# Patient Record
Sex: FEMALE | Race: BLACK OR AFRICAN AMERICAN | NOT HISPANIC OR LATINO | Employment: STUDENT | URBAN - METROPOLITAN AREA
[De-identification: names, ages, dates, MRNs, and addresses within clinical notes are randomized per-mention and may not be internally consistent; named-entity substitution may affect disease eponyms.]

---
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
## 2022-06-30 ENCOUNTER — OFFICE VISIT (OUTPATIENT)
Dept: PEDIATRICS | Facility: CLINIC | Age: 22
End: 2022-06-30
Payer: MEDICAID

## 2022-06-30 ENCOUNTER — TELEPHONE (OUTPATIENT)
Dept: PEDIATRICS | Facility: CLINIC | Age: 22
End: 2022-06-30

## 2022-06-30 VITALS
WEIGHT: 177.94 LBS | BODY MASS INDEX: 35.87 KG/M2 | TEMPERATURE: 98 F | OXYGEN SATURATION: 100 % | HEIGHT: 59 IN | HEART RATE: 83 BPM | DIASTOLIC BLOOD PRESSURE: 59 MMHG | SYSTOLIC BLOOD PRESSURE: 112 MMHG

## 2022-06-30 DIAGNOSIS — F90.0 ATTENTION DEFICIT HYPERACTIVITY DISORDER (ADHD), PREDOMINANTLY INATTENTIVE TYPE: Primary | ICD-10-CM

## 2022-06-30 DIAGNOSIS — J02.9 SORE THROAT: ICD-10-CM

## 2022-06-30 LAB
CTP QC/QA: YES
S PYO RRNA THROAT QL PROBE: NEGATIVE

## 2022-06-30 PROCEDURE — 1159F PR MEDICATION LIST DOCUMENTED IN MEDICAL RECORD: ICD-10-PCS | Mod: CPTII,S$GLB,, | Performed by: PEDIATRICS

## 2022-06-30 PROCEDURE — 3008F BODY MASS INDEX DOCD: CPT | Mod: CPTII,S$GLB,, | Performed by: PEDIATRICS

## 2022-06-30 PROCEDURE — 1160F RVW MEDS BY RX/DR IN RCRD: CPT | Mod: CPTII,S$GLB,, | Performed by: PEDIATRICS

## 2022-06-30 PROCEDURE — 3078F DIAST BP <80 MM HG: CPT | Mod: CPTII,S$GLB,, | Performed by: PEDIATRICS

## 2022-06-30 PROCEDURE — 3074F PR MOST RECENT SYSTOLIC BLOOD PRESSURE < 130 MM HG: ICD-10-PCS | Mod: CPTII,S$GLB,, | Performed by: PEDIATRICS

## 2022-06-30 PROCEDURE — 87880 STREP A ASSAY W/OPTIC: CPT | Mod: QW,,, | Performed by: PEDIATRICS

## 2022-06-30 PROCEDURE — 1160F PR REVIEW ALL MEDS BY PRESCRIBER/CLIN PHARMACIST DOCUMENTED: ICD-10-PCS | Mod: CPTII,S$GLB,, | Performed by: PEDIATRICS

## 2022-06-30 PROCEDURE — 3074F SYST BP LT 130 MM HG: CPT | Mod: CPTII,S$GLB,, | Performed by: PEDIATRICS

## 2022-06-30 PROCEDURE — 99214 OFFICE O/P EST MOD 30 MIN: CPT | Mod: S$GLB,,, | Performed by: PEDIATRICS

## 2022-06-30 PROCEDURE — 99214 PR OFFICE/OUTPT VISIT, EST, LEVL IV, 30-39 MIN: ICD-10-PCS | Mod: S$GLB,,, | Performed by: PEDIATRICS

## 2022-06-30 PROCEDURE — 87880 POCT RAPID STREP A: ICD-10-PCS | Mod: QW,,, | Performed by: PEDIATRICS

## 2022-06-30 PROCEDURE — 3078F PR MOST RECENT DIASTOLIC BLOOD PRESSURE < 80 MM HG: ICD-10-PCS | Mod: CPTII,S$GLB,, | Performed by: PEDIATRICS

## 2022-06-30 PROCEDURE — 1159F MED LIST DOCD IN RCRD: CPT | Mod: CPTII,S$GLB,, | Performed by: PEDIATRICS

## 2022-06-30 PROCEDURE — 3008F PR BODY MASS INDEX (BMI) DOCUMENTED: ICD-10-PCS | Mod: CPTII,S$GLB,, | Performed by: PEDIATRICS

## 2022-06-30 RX ORDER — LISDEXAMFETAMINE DIMESYLATE 50 MG/1
50 CAPSULE ORAL EVERY MORNING
Qty: 30 CAPSULE | Refills: 0 | Status: SHIPPED | OUTPATIENT
Start: 2022-06-30 | End: 2022-11-02

## 2022-06-30 RX ORDER — DEXTROAMPHETAMINE SACCHARATE, AMPHETAMINE ASPARTATE, DEXTROAMPHETAMINE SULFATE AND AMPHETAMINE SULFATE 2.5; 2.5; 2.5; 2.5 MG/1; MG/1; MG/1; MG/1
TABLET ORAL
Qty: 30 TABLET | Refills: 0 | Status: SHIPPED | OUTPATIENT
Start: 2022-06-30

## 2022-06-30 NOTE — PROGRESS NOTES
Subjective:      Mak [place] is a 21 y.o. female here with patient. Patient brought in for Headache (Bought in by:Self only), med check (Came by herself for med check.), and Sore Throat (Complaints of itchy  throat today./)      History of Present Illness:  HPI  Pt here for adhd med check  Takes vy 50 am, add reg 10 1/2 noon, 1/2 4 pm when at college  Worked well for patient  Wanted to get meds filled before school starts  Also with scratchy throat. Concerns about strep throat  No fever  Some headache  No meds  No known covid exposure    Review of Systems   Constitutional: Negative.  Negative for fever.   HENT: Positive for sore throat.    Eyes: Negative.    Respiratory: Negative.    Cardiovascular: Negative.    Gastrointestinal: Negative.    Endocrine: Negative.    Genitourinary: Negative.    Musculoskeletal: Negative.    Skin: Negative.    Allergic/Immunologic: Negative.    Neurological: Negative.    Hematological: Negative.    Psychiatric/Behavioral: Positive for decreased concentration.   All other systems reviewed and are negative.      Objective:     Physical Exam  nad  Tm's clear bilaterally  Post pharynx sl erythematous  heart rrr,   No murmur heard  No gallop heard  No rub noted  Lungs cta bilaterally   no increased work of breathing noted  No wheezes heard  No rales heard  No ronchi heard    Abdomen soft,   Bowel sounds present  Non tender  No masses palpated  No enlargement of liver or spleen palpated  No rashes noted  Mmm, cap refill brisk, less than 2 seconds  No obvious global/focal motor/sensory deficits  Cranial nerves 2-12 grossly intact  rom of all extremities normal for age      Assessment:        1. Attention deficit hyperactivity disorder (ADHD), predominantly inattentive type    2. Sore throat         Plan:       Mak was seen today for headache, med check and sore throat.    Diagnoses and all orders for this visit:    Attention deficit hyperactivity disorder (ADHD), predominantly  inattentive type  -     lisdexamfetamine (VYVANSE) 50 MG capsule; Take 1 capsule (50 mg total) by mouth every morning.  -     dextroamphetamine-amphetamine (ADDERALL) 10 mg Tab; 1/2 tab daily at noon and 1/2 tablet at 4pm    Sore throat  -     POCT rapid strep A      rf adhd meds    Temperature and pulse ox good in office today    Discussed covid testing.  Will hold for now    Discussed worrisome signs to seek urgent attention for  A total of 35 minutes was spent on patient record review, face to face time with patient including history and physical exam, medical decision making and patient/parent counseling  rtc 24-72 prn no  Improvement 24-72 hours or sooner prn problems.  Parent/guardian voiced understanding.

## 2022-10-24 ENCOUNTER — TELEPHONE (OUTPATIENT)
Dept: PEDIATRICS | Facility: CLINIC | Age: 22
End: 2022-10-24
Payer: MEDICAID

## 2022-10-24 NOTE — TELEPHONE ENCOUNTER
----- Message from Kari Wesley MA sent at 10/24/2022  2:05 PM CDT -----  Contact: Pt @ [phone]    ----- Message -----  From: Carol Mcnulty  Sent: 10/24/2022   1:58 PM CDT  To: [facility] Clinical Support    Pt retuning call about prescription. Pt doesn't want to give any other info about missed call, just that she needs to speak with someone from the clinic regarding meds.      Please call to advise.    Spoke with patient to schedule med check appointment. Patient needed a virtual appointment currently away at [facility].

## 2022-10-24 NOTE — TELEPHONE ENCOUNTER
----- Message from Yesika Jameson sent at 10/24/2022 12:49 PM CDT -----  Contact: Wmarh-[phone]    Requesting an RX refill or new RX.- Jose Corado-    Is this a refill or new RX: - Refill-    RX name and strength (dextroamphetamine-amphetamine (ADDERALL) 10 mg Tab     Is this a 30 day or 90 day RX: -30 tablet-    Pharmacy name and phone # (    [id number] - [place], [place] - [address] [place] [address] & [address] [address]  Phone: [phone] Fax: [phone]    The doctors have asked that we provide their patients with the following 2 reminders -- prescription refills can take up to 72 hours, and a friendly reminder that in the future you can use your [facility] account to request refills:- Call back-    Comments: Please call the patient back to advise.    Attempted to notify patient that a med check appointment is needed. Last med check 06/30/22 with provider that no longer practicing. Needs to see another doctor for medication management. Left message to rtc.

## 2022-11-02 ENCOUNTER — OFFICE VISIT (OUTPATIENT)
Dept: PEDIATRICS | Facility: CLINIC | Age: 22
End: 2022-11-02
Payer: MEDICAID

## 2022-11-02 DIAGNOSIS — F90.0 ATTENTION DEFICIT HYPERACTIVITY DISORDER (ADHD), PREDOMINANTLY INATTENTIVE TYPE: Primary | ICD-10-CM

## 2022-11-02 PROCEDURE — 99214 OFFICE O/P EST MOD 30 MIN: CPT | Mod: 95,,, | Performed by: PEDIATRICS

## 2022-11-02 PROCEDURE — 99214 PR OFFICE/OUTPT VISIT, EST, LEVL IV, 30-39 MIN: ICD-10-PCS | Mod: 95,,, | Performed by: PEDIATRICS

## 2022-11-02 RX ORDER — LISDEXAMFETAMINE DIMESYLATE 30 MG/1
30 CAPSULE ORAL EVERY MORNING
Qty: 30 CAPSULE | Refills: 0 | Status: SHIPPED | OUTPATIENT
Start: 2022-11-02

## 2022-11-02 NOTE — PROGRESS NOTES
The patient location is: [place], [place]   The chief complaint leading to consultation is: med check     Visit type: audiovisual    Face to Face time with patient: 15 min  20 minutes of total time spent on the encounter, which includes face to face time and non-face to face time preparing to see the patient (eg, review of tests), Obtaining and/or reviewing separately obtained history, Documenting clinical information in the electronic or other health record, Independently interpreting results (not separately reported) and communicating results to the patient/family/caregiver, or Care coordination (not separately reported).         Each patient to whom he or she provides medical services by telemedicine is:  (1) informed of the relationship between the physician and patient and the respective role of any other health care provider with respect to management of the patient; and (2) notified that he or she may decline to receive medical services by telemedicine and may withdraw from such care at any time.    Notes:     History was provided by the patient.  Mak [place] is a 21 y.o. female here for ADHD follow up and medication management.      Current medication(s): Vyvanase 50mg (were also previously prescribed adderall short acting as well)  Takes Medication: school days only  Currently in: university,    Attends: in person classes  School performance/Behavior: no concerns; age appropriate  Reports that they are doing well but would like to try to decrease dosage to 30mg.   Appetite: somewhat decreased while on medications but overall ok  Sleep:difficulty with going to sleep  Side effects: abdominal pain from hunger   Patient has had medication holidays.    Past Medical History:   Diagnosis Date    ADHD (attention deficit hyperactivity disorder) 9/11/2012    Med check due 10/30/16 Vyvanse 40 add       Review of Systems  Review of Systems    A comprehensive review of symptoms was completed and negative  except as noted above.    Objective:   There were no vitals filed for this visit.   Physical Exam  Constitutional:       Appearance: Normal appearance. She is not ill-appearing.   HENT:      Right Ear: External ear normal.      Left Ear: External ear normal.      Nose: Nose normal.      Mouth/Throat:      Mouth: Mucous membranes are moist.   Eyes:      Conjunctiva/sclera: Conjunctivae normal.   Pulmonary:      Effort: Pulmonary effort is normal.   Neurological:      Mental Status: She is alert.     Assessment:   Attention deficit hyperactivity disorder (ADHD), predominantly inattentive type  -     lisdexamfetamine (VYVANSE) 30 MG capsule; Take 1 capsule (30 mg total) by mouth every morning.  Dispense: 30 capsule; Refill: 0     Decreased to vyvanse 30mg from 50mg. pt to send message in 1 month, if still doing well, will refill for additional 2 months. Recommended against short acting stimulant medication and will not fill at this time. Family expressed agreement and understanding of plan and all questions were answered.       Plan:      1. Parent is aware of palpitations as a possible side effect and denies family history of heart disease. The side effects of abdominal pain and headaches  an be treated with tylenol. Insomnia and irritability can be treated with certain adjuvant therapies like clonidine and guanfacine. Lastly, transient anorexia can be treated wtih periactin as needed. Parent was counseled on medication holidays and that it can also help improve the efficacy of the medication.

## 2022-11-06 ENCOUNTER — NURSE TRIAGE (OUTPATIENT)
Dept: ADMINISTRATIVE | Facility: CLINIC | Age: 22
End: 2022-11-06
Payer: MEDICAID

## 2022-11-07 NOTE — TELEPHONE ENCOUNTER
"Patient states she recently went to [facility] and [place] diagnosed with an upper respiratory infection. She was prescribed azithromycin and phenergan. About 20 minutes ago she took the medications together and shortly after began experiencing abdominal cramping below her belly button; rates at a 5/10 on numerical pain scale. She states it feels like a burning sensation. Care advice is home care.     Reason for Disposition   [1] MILD-MODERATE pain AND [2] comes and goes (cramps)    Additional Information   Negative: Shock suspected (e.g., cold/pale/clammy skin, too weak to stand, low BP, rapid pulse)   Negative: Difficult to awaken or acting confused (e.g., disoriented, slurred speech)   Negative: Passed out (i.e., lost consciousness, collapsed and was not responding)   Negative: Sounds like a life-threatening emergency to the triager   Negative: [1] SEVERE pain (e.g., excruciating) AND [2] present > 1 hour   Negative: [1] SEVERE pain AND [2] age > 60 years   Negative: [1] Vomiting AND [2] contains red blood or black ("coffee ground") material  (Exception: few red streaks in vomit that only happened once)   Negative: Blood in bowel movements (Exception: blood on surface of BM with constipation)   Negative: Black or tarry bowel movements (Exception: chronic-unchanged black-grey bowel movements AND is taking iron pills or Pepto-bismol)   Negative: Patient sounds very sick or weak to the triager   Negative: [1] MILD-MODERATE pain AND [2] constant AND [3] present > 2 hours   Negative: [1] Vomiting AND [2] abdomen looks much more swollen than usual   Negative: [1] Vomiting AND [2] contains bile (green color)   Negative: White of the eyes have turned yellow (i.e., jaundice)   Negative: Fever > 103 F (39.4 C)   Negative: [1] Fever > 101 F (38.3 C) AND [2] age > 60 years   Negative: [1] Fever > 100.0 F (37.8 C) AND [2] bedridden (e.g., nursing home patient, CVA, chronic illness, recovering from surgery)   Negative: [1] Fever > 100.0 " F (37.8 C) AND [2] diabetes mellitus or weak immune system (e.g., HIV positive, cancer chemo, splenectomy, organ transplant, chronic steroids)   Negative: [1] SEVERE pain AND [2] present < 1 hour   Negative: [1] MODERATE pain (e.g., interferes with normal activities) AND [2] pain comes and goes (cramps) AND [3] present > 24 hours  (Exception: pain with Vomiting or Diarrhea - see that Guideline)   Negative: [1] MILD pain (e.g., does not interfere with normal activities) AND [2] pain comes and goes (cramps) AND [3] present > 48 hours  (Exception: this same abdominal pain is a chronic symptom recurrent or ongoing AND present > 4 weeks)   Negative: Age > 60 years   Negative: Pregnancy suspected (e.g., missed last menstrual period)   Negative: Unusual vaginal discharge (e.g., bad smelling, yellow, green, or foamy-white)   Negative: Blood in urine (red, pink, or tea-colored)   Negative: Abdominal pain is a chronic symptom (recurrent or ongoing AND present > 4 weeks)   Negative: Pain with sexual intercourse (dyspareunia)   Negative: [1] MILD-MODERATE pain AND [2] constant and [3] present < 2 hours    Protocols used: Abdominal Pain - Female-A-AH

## 2023-02-07 ENCOUNTER — PATIENT MESSAGE (OUTPATIENT)
Dept: PEDIATRICS | Facility: CLINIC | Age: 23
End: 2023-02-07
Payer: MEDICAID

## 2023-07-06 ENCOUNTER — PATIENT MESSAGE (OUTPATIENT)
Dept: PEDIATRICS | Facility: CLINIC | Age: 23
End: 2023-07-06
Payer: MEDICAID

## 2023-09-06 ENCOUNTER — HOSPITAL ENCOUNTER (EMERGENCY)
Facility: HOSPITAL | Age: 23
Discharge: HOME | End: 2023-09-07
Attending: EMERGENCY MEDICINE
Payer: MEDICAID

## 2023-09-06 DIAGNOSIS — J06.9 VIRAL UPPER RESPIRATORY INFECTION: Primary | ICD-10-CM

## 2023-09-06 DIAGNOSIS — L73.2 HIDRADENITIS SUPPURATIVA OF LEFT AXILLA: ICD-10-CM

## 2023-09-06 LAB
B-HCG UR QL: NEGATIVE
BACTERIA #/AREA URNS HPF: ABNORMAL /HPF
BILIRUB UR QL STRIP: NEGATIVE
CLARITY UR: CLEAR
COLOR UR: YELLOW
GLUCOSE UR QL STRIP: NEGATIVE
HGB UR QL STRIP: NEGATIVE
KETONES UR QL STRIP: NEGATIVE
LEUKOCYTE ESTERASE UR QL STRIP: ABNORMAL
MICROSCOPIC COMMENT: ABNORMAL
NITRITE UR QL STRIP: NEGATIVE
PH UR STRIP: 7 [PH] (ref 5–8)
PROT UR QL STRIP: ABNORMAL
RBC #/AREA URNS HPF: 5 /HPF (ref 0–4)
SARS-COV-2 RDRP RESP QL NAA+PROBE: NEGATIVE
SP GR UR STRIP: 1.02 (ref 1–1.03)
SQUAMOUS #/AREA URNS HPF: 4 /HPF
URN SPEC COLLECT METH UR: ABNORMAL
UROBILINOGEN UR STRIP-ACNC: NEGATIVE EU/DL
WBC #/AREA URNS HPF: 3 /HPF (ref 0–5)

## 2023-09-06 PROCEDURE — 81000 URINALYSIS NONAUTO W/SCOPE: CPT | Performed by: PHYSICIAN ASSISTANT

## 2023-09-06 PROCEDURE — U0002 COVID-19 LAB TEST NON-CDC: HCPCS | Performed by: PHYSICIAN ASSISTANT

## 2023-09-06 PROCEDURE — 99283 EMERGENCY DEPT VISIT LOW MDM: CPT

## 2023-09-06 PROCEDURE — 25000003 PHARM REV CODE 250: Performed by: PHYSICIAN ASSISTANT

## 2023-09-06 PROCEDURE — 81025 URINE PREGNANCY TEST: CPT | Performed by: PHYSICIAN ASSISTANT

## 2023-09-06 RX ORDER — IBUPROFEN 400 MG/1
800 TABLET ORAL
Status: COMPLETED | OUTPATIENT
Start: 2023-09-06 | End: 2023-09-06

## 2023-09-06 RX ORDER — ACETAMINOPHEN 500 MG
1000 TABLET ORAL
Status: COMPLETED | OUTPATIENT
Start: 2023-09-06 | End: 2023-09-06

## 2023-09-06 RX ADMIN — IBUPROFEN 800 MG: 400 TABLET ORAL at 11:09

## 2023-09-06 RX ADMIN — ACETAMINOPHEN 1000 MG: 500 TABLET ORAL at 11:09

## 2023-09-07 VITALS
WEIGHT: 183 LBS | DIASTOLIC BLOOD PRESSURE: 69 MMHG | BODY MASS INDEX: 36.89 KG/M2 | SYSTOLIC BLOOD PRESSURE: 106 MMHG | HEIGHT: 59 IN | TEMPERATURE: 98 F | RESPIRATION RATE: 16 BRPM | HEART RATE: 92 BPM | OXYGEN SATURATION: 99 %

## 2023-09-07 RX ORDER — CLINDAMYCIN HYDROCHLORIDE 150 MG/1
300 CAPSULE ORAL 4 TIMES DAILY
Qty: 40 CAPSULE | Refills: 0 | Status: SHIPPED | OUTPATIENT
Start: 2023-09-07 | End: 2023-09-12

## 2023-09-07 NOTE — ED NOTES
Pt awake, alert and oriented x 4. Resp even and unlabored on RA, lung fields clear throughout all lobes. Pt reports she began having a headache and chills this morning. Pt reports that her coworker has covid. Pt denies sore throat, n/v/d, cough or body aches. Skin wdi.

## 2023-09-07 NOTE — ED PROVIDER NOTES
Encounter Date: 9/6/2023       History     Chief Complaint   Patient presents with    Fever     Patient c/o chills and general body ache that began today.  Reported fever of 102.1 thirty minutes prior to arrival.       Patient is a 22-year-old female who presents emergency room for evaluation of a runny nose body aches chills.  She has a mild headache.  She had fever of 102.1 prior to coming to the emergency room.  She is no chest pain cough abdominal pain nausea vomiting diarrhea dysuria.  She does have some bumps in her left axilla.  She denies any bumps on her breast.  She has no history of hidradenitis.  She denies any IV drug use.      Review of patient's allergies indicates:  No Known Allergies  Past Medical History:   Diagnosis Date    ADHD (attention deficit hyperactivity disorder) 9/11/2012    Med check due 10/30/16 Vyvanse 40 add     Past Surgical History:   Procedure Laterality Date    NO PAST SURGERIES       Family History   Problem Relation Age of Onset    No Known Problems Mother     No Known Problems Father      Social History     Tobacco Use    Smoking status: Never   Substance Use Topics    Alcohol use: No    Drug use: No     Review of Systems   Constitutional:  Positive for fever. Negative for appetite change and diaphoresis.   HENT:  Positive for congestion and rhinorrhea. Negative for dental problem, ear pain, postnasal drip and sore throat.    Eyes:  Negative for pain and visual disturbance.   Respiratory:  Negative for cough and shortness of breath.    Cardiovascular:  Negative for chest pain.   Gastrointestinal:  Negative for abdominal pain, diarrhea, nausea and vomiting.   Genitourinary:  Negative for difficulty urinating.   Musculoskeletal:  Negative for arthralgias.   Skin:  Negative for rash.        Bumps in her left axilla   Neurological:  Negative for weakness, numbness and headaches.   All other systems reviewed and are negative.      Physical Exam     Initial Vitals [09/06/23 2113]    BP Pulse Resp Temp SpO2   (!) 128/59 (!) 112 18 99.9 °F (37.7 °C) 98 %      MAP       --         Physical Exam    Nursing note and vitals reviewed.  Constitutional: She appears well-developed and well-nourished.  Non-toxic appearance. No distress.   HENT:   Head: Normocephalic and atraumatic.   Mouth/Throat: Oropharynx is clear and moist.   Eyes: EOM are normal. Pupils are equal, round, and reactive to light.   Neck: Neck supple.   Cardiovascular:  Normal rate, regular rhythm, normal heart sounds and intact distal pulses.     Exam reveals no gallop and no friction rub.       No murmur heard.  Pulmonary/Chest: Breath sounds normal. No respiratory distress. She has no decreased breath sounds. She has no wheezes. She has no rhonchi. She has no rales.   Abdominal: Abdomen is soft. Bowel sounds are normal. She exhibits no distension. There is no abdominal tenderness.   Musculoskeletal:         General: Normal range of motion.      Cervical back: Neck supple.     Neurological: She is alert and oriented to person, place, and time. She has normal strength. No sensory deficit.   Skin: Skin is warm and dry.   Patient has palpable nodules in the left axilla.  There are no pinpoint pustules.  There is no drainage.  They are tender.  Does not necessarily warm.  There is no significant erythema.   Psychiatric: She has a normal mood and affect.         ED Course   Procedures  Labs Reviewed   URINALYSIS, REFLEX TO URINE CULTURE - Abnormal; Notable for the following components:       Result Value    Protein, UA Trace (*)     Leukocytes, UA Trace (*)     All other components within normal limits    Narrative:     Specimen Source->Urine   URINALYSIS MICROSCOPIC - Abnormal; Notable for the following components:    RBC, UA 5 (*)     Bacteria Few (*)     All other components within normal limits    Narrative:     Specimen Source->Urine   SARS-COV-2 RNA AMPLIFICATION, QUAL   PREGNANCY TEST, URINE RAPID    Narrative:     Specimen  Source->Urine          Imaging Results    None          Medications   ibuprofen tablet 800 mg (800 mg Oral Given 9/6/23 2330)   acetaminophen tablet 1,000 mg (1,000 mg Oral Given 9/6/23 2329)     Medical Decision Making  I suspect the patient has hidradenitis suppurativa in the left axilla.  I do not think there is a drainable abscess at this time.  She denies any breast masses.  She states she does self-breast exams.  I will start her on clindamycin.      In regards to her runny nose suspect she has early viral upper respiratory infection.  There is no signs of pneumonia.  I do not believe she is septic or toxic.  I do not think she needs labs.  Her abdomen is soft nontender nondistended.  Lungs are clear.  I do not think she needs an x-ray at this time.  Return precautions have been given and stable for discharge.  I do not believe her urinalysis is indicative of a urinary tract infection.  She has no urinary complaints.                               Clinical Impression:   Final diagnoses:  [J06.9] Viral upper respiratory infection (Primary)  [L73.2] Hidradenitis suppurativa of left axilla        ED Disposition Condition    Discharge Stable          ED Prescriptions       Medication Sig Dispense Start Date End Date Auth. Provider    clindamycin (CLEOCIN) 150 MG capsule Take 2 capsules (300 mg total) by mouth 4 (four) times daily. for 5 days 40 capsule 9/7/2023 9/12/2023 Diego Smith MD          Follow-up Information       Follow up With Specialties Details Why Contact Info    Pool Myers MD Pediatrics Schedule an appointment as soon as possible for a visit   [address] [place] [address]  [place] [address]  [phone]               Diego Smith MD  09/07/23 0036

## 2023-09-07 NOTE — FIRST PROVIDER EVALUATION
Emergency Department TeleTriage Encounter Note      CHIEF COMPLAINT    Chief Complaint   Patient presents with    Fever     Patient c/o chills and general body ache that began today.  Reported fever of 102.1 thirty minutes prior to arrival.         VITAL SIGNS   Initial Vitals [09/06/23 2113]   BP Pulse Resp Temp SpO2   (!) 128/59 (!) 112 18 99.9 °F (37.7 °C) 98 %      MAP       --            ALLERGIES    Review of patient's allergies indicates:  No Known Allergies    PROVIDER TRIAGE NOTE    22-year-old female presents with 1 day symptoms of generalized malaise, fatigue and fever.  No medication taken prior to arrival.  Appears nondistressed on exam.    ORDERS  Labs Reviewed - No data to display    ED Orders (720h ago, onward)      None              Virtual Visit Note: The provider triage portion of this emergency department evaluation and documentation was performed via [employer], a HIPAA-compliant telemedicine application, in concert with a tele-presenter in the room. A face to face patient evaluation with one of my colleagues will occur once the patient is placed in an emergency department room.      DISCLAIMER: This note was prepared with [employer] voice recognition transcription software. Garbled syntax, mangled pronouns, and other bizarre constructions may be attributed to that software system.

## 2024-07-11 ENCOUNTER — OFFICE VISIT (OUTPATIENT)
Dept: URGENT CARE | Facility: CLINIC | Age: 24
End: 2024-07-11

## 2024-07-11 VITALS
RESPIRATION RATE: 16 BRPM | TEMPERATURE: 98 F | HEART RATE: 80 BPM | OXYGEN SATURATION: 99 % | SYSTOLIC BLOOD PRESSURE: 104 MMHG | WEIGHT: 179 LBS | HEIGHT: 59 IN | BODY MASS INDEX: 36.08 KG/M2 | DIASTOLIC BLOOD PRESSURE: 71 MMHG

## 2024-07-11 DIAGNOSIS — J02.9 SORE THROAT: Primary | ICD-10-CM

## 2024-07-11 DIAGNOSIS — R53.83 FATIGUE, UNSPECIFIED TYPE: ICD-10-CM

## 2024-07-11 DIAGNOSIS — M54.2 NECK PAIN: ICD-10-CM

## 2024-07-11 LAB
CTP QC/QA: YES
S PYO RRNA THROAT QL PROBE: NEGATIVE

## 2024-07-11 RX ORDER — DEXAMETHASONE SODIUM PHOSPHATE 4 MG/ML
8 INJECTION, SOLUTION INTRA-ARTICULAR; INTRALESIONAL; INTRAMUSCULAR; INTRAVENOUS; SOFT TISSUE
Status: COMPLETED | OUTPATIENT
Start: 2024-07-11 | End: 2024-07-11

## 2024-07-11 RX ADMIN — DEXAMETHASONE SODIUM PHOSPHATE 8 MG: 4 INJECTION, SOLUTION INTRA-ARTICULAR; INTRALESIONAL; INTRAMUSCULAR; INTRAVENOUS; SOFT TISSUE at 01:07

## 2024-07-11 NOTE — PROGRESS NOTES
"Subjective:      Patient ID: Mak aGrcia is a 23 y.o. female.    Vitals:  height is 4' 11" (1.499 m) and weight is 81.2 kg (179 lb). Her temperature is 98.4 °F (36.9 °C). Her blood pressure is 104/71 and her pulse is 80. Her respiration is 16 and oxygen saturation is 99%.     Chief Complaint: Neck Pain    Pt c/o neck pain x2 days. Pt states her whole neck is hurting and pain is worsening.     Neck Pain   Pertinent negatives include no fever, headaches, numbness or trouble swallowing.       Constitution: Positive for appetite change, chills and fatigue. Negative for fever.   HENT:  Positive for sore throat. Negative for ear pain, ear discharge, drooling, congestion, trouble swallowing and voice change.    Neck: Positive for neck pain and neck stiffness. Negative for neck swelling.   Eyes:  Negative for vision loss, double vision and blurred vision.   Respiratory:  Negative for chest tightness, cough (cough last week, resolved) and shortness of breath.    Gastrointestinal:  Negative for abdominal pain, nausea, vomiting and diarrhea.   Musculoskeletal:  Negative for trauma.   Skin:  Negative for rash and erythema.   Neurological:  Negative for headaches, disorientation, altered mental status, numbness and tingling.   Psychiatric/Behavioral:  Negative for altered mental status and disorientation.       Objective:     Physical Exam   Constitutional: She is oriented to person, place, and time.  Non-toxic appearance. She does not appear ill. No distress. obesity  HENT:   Head: Normocephalic and atraumatic.   Ears:   Right Ear: Tympanic membrane, external ear and ear canal normal.   Left Ear: Tympanic membrane, external ear and ear canal normal.   Nose: Nose normal.   Mouth/Throat: Mucous membranes are moist. No oropharyngeal exudate or posterior oropharyngeal erythema.   Eyes: Conjunctivae are normal. Pupils are equal, round, and reactive to light. Right eye exhibits no discharge. Left eye exhibits no discharge. " Extraocular movement intact   Neck: Neck supple. There are no signs of injury. No torticollis present. No neck rigidity present. No decreased range of motion present. pain with movement present. No spinous process tenderness present. muscular tenderness present.   Cardiovascular: Normal rate and regular rhythm.   Pulmonary/Chest: Effort normal. No respiratory distress.   Abdominal: Normal appearance.   Musculoskeletal:      Cervical back: She exhibits tenderness.   Lymphadenopathy:     She has no cervical adenopathy.   Neurological: no focal deficit. She is alert and oriented to person, place, and time.   Skin: Skin is warm, dry, not diaphoretic and no rash. Capillary refill takes less than 2 seconds. No bruising, No erythema and No lesion   Psychiatric: Her behavior is normal. Mood normal.   Nursing note and vitals reviewed.      Assessment:     1. Sore throat    2. Neck pain    3. Fatigue, unspecified type          Strep A negative      Plan:       Sore throat  -     POCT rapid strep A  -     Cancel: SARS Coronavirus 2 Antigen, POCT Manual Read  -     Cancel: POCT Influenza A/B  -     Cancel: POCT Infectious mononucleosis antibody    Neck pain  -     dexAMETHasone injection 8 mg    Fatigue, unspecified type  -     POCT rapid strep A  -     Cancel: SARS Coronavirus 2 Antigen, POCT Manual Read  -     Cancel: POCT Influenza A/B  -     Cancel: POCT Infectious mononucleosis antibody

## 2024-07-11 NOTE — PATIENT INSTRUCTIONS
Rest, hydrate  Ibuprofen 600-800 mg every 6 hrs as needed for pain.  Always take with food.    ER for worsening of symptoms.

## 2025-03-18 ENCOUNTER — OFFICE VISIT (OUTPATIENT)
Dept: URGENT CARE | Facility: CLINIC | Age: 25
End: 2025-03-18

## 2025-03-18 ENCOUNTER — HOSPITAL ENCOUNTER (OUTPATIENT)
Facility: HOSPITAL | Age: 25
LOS: 1 days | Discharge: HOME | End: 2025-03-19
Attending: EMERGENCY MEDICINE

## 2025-03-18 VITALS
HEIGHT: 59 IN | RESPIRATION RATE: 18 BRPM | WEIGHT: 179 LBS | HEART RATE: 121 BPM | TEMPERATURE: 98 F | DIASTOLIC BLOOD PRESSURE: 83 MMHG | OXYGEN SATURATION: 99 % | BODY MASS INDEX: 36.08 KG/M2 | SYSTOLIC BLOOD PRESSURE: 121 MMHG

## 2025-03-18 DIAGNOSIS — R10.13 EPIGASTRIC PAIN: ICD-10-CM

## 2025-03-18 DIAGNOSIS — R11.2 NAUSEA AND VOMITING, UNSPECIFIED VOMITING TYPE: Primary | ICD-10-CM

## 2025-03-18 DIAGNOSIS — R00.0 TACHYCARDIA: ICD-10-CM

## 2025-03-18 DIAGNOSIS — R07.9 CHEST PAIN: ICD-10-CM

## 2025-03-18 PROBLEM — E86.0 DEHYDRATION: Status: ACTIVE | Noted: 2025-03-18

## 2025-03-18 PROBLEM — E66.9 OBESITY (BMI 30-39.9): Status: ACTIVE | Noted: 2025-03-18

## 2025-03-18 PROBLEM — E87.1 HYPONATREMIA: Status: ACTIVE | Noted: 2025-03-18

## 2025-03-18 PROBLEM — R74.8 ELEVATED LIPASE: Status: ACTIVE | Noted: 2025-03-18

## 2025-03-18 LAB
ALBUMIN SERPL BCP-MCNC: 4.3 G/DL (ref 3.5–5.2)
ALP SERPL-CCNC: 82 U/L (ref 40–150)
ALT SERPL W/O P-5'-P-CCNC: 10 U/L (ref 10–44)
ANION GAP SERPL CALC-SCNC: 11 MMOL/L (ref 8–16)
AST SERPL-CCNC: 20 U/L (ref 10–40)
B-HCG UR QL: NEGATIVE
BACTERIA #/AREA URNS HPF: ABNORMAL /HPF
BASOPHILS # BLD AUTO: 0.01 K/UL (ref 0–0.2)
BASOPHILS NFR BLD: 0.1 % (ref 0–1.9)
BILIRUB SERPL-MCNC: 0.6 MG/DL (ref 0.1–1)
BILIRUB UR QL STRIP: NEGATIVE
BUN SERPL-MCNC: 17 MG/DL (ref 6–20)
CALCIUM SERPL-MCNC: 9.6 MG/DL (ref 8.7–10.5)
CHLORIDE SERPL-SCNC: 102 MMOL/L (ref 95–110)
CLARITY UR: ABNORMAL
CO2 SERPL-SCNC: 22 MMOL/L (ref 23–29)
COLOR UR: YELLOW
CREAT SERPL-MCNC: 0.8 MG/DL (ref 0.5–1.4)
CTP QC/QA: YES
DIFFERENTIAL METHOD BLD: ABNORMAL
EOSINOPHIL # BLD AUTO: 0 K/UL (ref 0–0.5)
EOSINOPHIL NFR BLD: 0 % (ref 0–8)
ERYTHROCYTE [DISTWIDTH] IN BLOOD BY AUTOMATED COUNT: 12.2 % (ref 11.5–14.5)
EST. GFR  (NO RACE VARIABLE): >60 ML/MIN/1.73 M^2
GLUCOSE SERPL-MCNC: 74 MG/DL (ref 70–110)
GLUCOSE UR QL STRIP: NEGATIVE
HCT VFR BLD AUTO: 42.3 % (ref 37–48.5)
HCV AB SERPL QL IA: NEGATIVE
HGB BLD-MCNC: 13.4 G/DL (ref 12–16)
HGB UR QL STRIP: ABNORMAL
HIV 1+2 AB+HIV1 P24 AG SERPL QL IA: NEGATIVE
HYALINE CASTS #/AREA URNS LPF: 1 /LPF
IMM GRANULOCYTES # BLD AUTO: 0.03 K/UL (ref 0–0.04)
IMM GRANULOCYTES NFR BLD AUTO: 0.3 % (ref 0–0.5)
KETONES UR QL STRIP: ABNORMAL
LEUKOCYTE ESTERASE UR QL STRIP: ABNORMAL
LIPASE SERPL-CCNC: 79 U/L (ref 4–60)
LYMPHOCYTES # BLD AUTO: 0.9 K/UL (ref 1–4.8)
LYMPHOCYTES NFR BLD: 9.6 % (ref 18–48)
MAGNESIUM SERPL-MCNC: 1.9 MG/DL (ref 1.6–2.6)
MCH RBC QN AUTO: 27.3 PG (ref 27–31)
MCHC RBC AUTO-ENTMCNC: 31.7 G/DL (ref 32–36)
MCV RBC AUTO: 86 FL (ref 82–98)
MICROSCOPIC COMMENT: ABNORMAL
MONOCYTES # BLD AUTO: 0.7 K/UL (ref 0.3–1)
MONOCYTES NFR BLD: 7.4 % (ref 4–15)
NEUTROPHILS # BLD AUTO: 7.8 K/UL (ref 1.8–7.7)
NEUTROPHILS NFR BLD: 82.6 % (ref 38–73)
NITRITE UR QL STRIP: NEGATIVE
NRBC BLD-RTO: 0 /100 WBC
PH UR STRIP: 6 [PH] (ref 5–8)
PHOSPHATE SERPL-MCNC: 3.1 MG/DL (ref 2.7–4.5)
PLATELET # BLD AUTO: 375 K/UL (ref 150–450)
PMV BLD AUTO: 10.3 FL (ref 9.2–12.9)
POTASSIUM SERPL-SCNC: 4.2 MMOL/L (ref 3.5–5.1)
PROT SERPL-MCNC: 8.8 G/DL (ref 6–8.4)
PROT UR QL STRIP: ABNORMAL
RBC # BLD AUTO: 4.9 M/UL (ref 4–5.4)
RBC #/AREA URNS HPF: 5 /HPF (ref 0–4)
SODIUM SERPL-SCNC: 135 MMOL/L (ref 136–145)
SP GR UR STRIP: >1.03 (ref 1–1.03)
SQUAMOUS #/AREA URNS HPF: 10 /HPF
TSH SERPL DL<=0.005 MIU/L-ACNC: 0.64 UIU/ML (ref 0.4–4)
UNIDENT CRYS URNS QL MICRO: 4
URN SPEC COLLECT METH UR: ABNORMAL
UROBILINOGEN UR STRIP-ACNC: NEGATIVE EU/DL
WBC # BLD AUTO: 9.49 K/UL (ref 3.9–12.7)
WBC #/AREA URNS HPF: 6 /HPF (ref 0–5)
YEAST URNS QL MICRO: ABNORMAL

## 2025-03-18 PROCEDURE — 93005 ELECTROCARDIOGRAM TRACING: CPT

## 2025-03-18 PROCEDURE — 85025 COMPLETE CBC W/AUTO DIFF WBC: CPT | Performed by: NURSE PRACTITIONER

## 2025-03-18 PROCEDURE — 36415 COLL VENOUS BLD VENIPUNCTURE: CPT | Performed by: NURSE PRACTITIONER

## 2025-03-18 PROCEDURE — 99285 EMERGENCY DEPT VISIT HI MDM: CPT | Mod: 25

## 2025-03-18 PROCEDURE — 99499 UNLISTED E&M SERVICE: CPT | Mod: TIER,S$GLB,, | Performed by: PHYSICIAN ASSISTANT

## 2025-03-18 PROCEDURE — 84100 ASSAY OF PHOSPHORUS: CPT | Performed by: NURSE PRACTITIONER

## 2025-03-18 PROCEDURE — 96365 THER/PROPH/DIAG IV INF INIT: CPT

## 2025-03-18 PROCEDURE — 93010 ELECTROCARDIOGRAM REPORT: CPT | Mod: ,,, | Performed by: GENERAL PRACTICE

## 2025-03-18 PROCEDURE — 87389 HIV-1 AG W/HIV-1&-2 AB AG IA: CPT | Performed by: EMERGENCY MEDICINE

## 2025-03-18 PROCEDURE — 83690 ASSAY OF LIPASE: CPT | Performed by: NURSE PRACTITIONER

## 2025-03-18 PROCEDURE — 63600175 PHARM REV CODE 636 W HCPCS: Performed by: NURSE PRACTITIONER

## 2025-03-18 PROCEDURE — 81000 URINALYSIS NONAUTO W/SCOPE: CPT | Performed by: NURSE PRACTITIONER

## 2025-03-18 PROCEDURE — 81025 URINE PREGNANCY TEST: CPT | Performed by: NURSE PRACTITIONER

## 2025-03-18 PROCEDURE — 36415 COLL VENOUS BLD VENIPUNCTURE: CPT | Performed by: EMERGENCY MEDICINE

## 2025-03-18 PROCEDURE — 84443 ASSAY THYROID STIM HORMONE: CPT | Performed by: NURSE PRACTITIONER

## 2025-03-18 PROCEDURE — 96375 TX/PRO/DX INJ NEW DRUG ADDON: CPT

## 2025-03-18 PROCEDURE — 25000003 PHARM REV CODE 250: Performed by: NURSE PRACTITIONER

## 2025-03-18 PROCEDURE — 80053 COMPREHEN METABOLIC PANEL: CPT | Performed by: NURSE PRACTITIONER

## 2025-03-18 PROCEDURE — 86803 HEPATITIS C AB TEST: CPT | Performed by: EMERGENCY MEDICINE

## 2025-03-18 PROCEDURE — 83735 ASSAY OF MAGNESIUM: CPT | Performed by: NURSE PRACTITIONER

## 2025-03-18 PROCEDURE — G0378 HOSPITAL OBSERVATION PER HR: HCPCS

## 2025-03-18 PROCEDURE — 96361 HYDRATE IV INFUSION ADD-ON: CPT

## 2025-03-18 RX ORDER — PANTOPRAZOLE SODIUM 40 MG/10ML
40 INJECTION, POWDER, LYOPHILIZED, FOR SOLUTION INTRAVENOUS
Status: COMPLETED | OUTPATIENT
Start: 2025-03-18 | End: 2025-03-18

## 2025-03-18 RX ORDER — ACETAMINOPHEN 325 MG/1
650 TABLET ORAL EVERY 4 HOURS PRN
Status: DISCONTINUED | OUTPATIENT
Start: 2025-03-18 | End: 2025-03-19 | Stop reason: HOSPADM

## 2025-03-18 RX ORDER — ONDANSETRON HYDROCHLORIDE 2 MG/ML
4 INJECTION, SOLUTION INTRAVENOUS
Status: COMPLETED | OUTPATIENT
Start: 2025-03-18 | End: 2025-03-18

## 2025-03-18 RX ORDER — ACETAMINOPHEN 325 MG/1
650 TABLET ORAL EVERY 8 HOURS PRN
Status: DISCONTINUED | OUTPATIENT
Start: 2025-03-18 | End: 2025-03-19 | Stop reason: HOSPADM

## 2025-03-18 RX ORDER — ALUMINUM HYDROXIDE, MAGNESIUM HYDROXIDE, AND SIMETHICONE 1200; 120; 1200 MG/30ML; MG/30ML; MG/30ML
30 SUSPENSION ORAL 4 TIMES DAILY PRN
Status: DISCONTINUED | OUTPATIENT
Start: 2025-03-18 | End: 2025-03-19 | Stop reason: HOSPADM

## 2025-03-18 RX ORDER — AMOXICILLIN 250 MG
1 CAPSULE ORAL DAILY PRN
Status: DISCONTINUED | OUTPATIENT
Start: 2025-03-18 | End: 2025-03-19 | Stop reason: HOSPADM

## 2025-03-18 RX ORDER — MORPHINE SULFATE 2 MG/ML
2 INJECTION, SOLUTION INTRAMUSCULAR; INTRAVENOUS EVERY 6 HOURS PRN
Status: DISCONTINUED | OUTPATIENT
Start: 2025-03-18 | End: 2025-03-19 | Stop reason: HOSPADM

## 2025-03-18 RX ORDER — LORAZEPAM 2 MG/ML
2 INJECTION INTRAMUSCULAR EVERY 6 HOURS PRN
Status: DISCONTINUED | OUTPATIENT
Start: 2025-03-18 | End: 2025-03-19

## 2025-03-18 RX ORDER — IBUPROFEN 200 MG
16 TABLET ORAL
Status: DISCONTINUED | OUTPATIENT
Start: 2025-03-18 | End: 2025-03-19 | Stop reason: HOSPADM

## 2025-03-18 RX ORDER — PANTOPRAZOLE SODIUM 40 MG/10ML
40 INJECTION, POWDER, LYOPHILIZED, FOR SOLUTION INTRAVENOUS DAILY
Status: DISCONTINUED | OUTPATIENT
Start: 2025-03-18 | End: 2025-03-19 | Stop reason: HOSPADM

## 2025-03-18 RX ORDER — SODIUM CHLORIDE, SODIUM LACTATE, POTASSIUM CHLORIDE, CALCIUM CHLORIDE 600; 310; 30; 20 MG/100ML; MG/100ML; MG/100ML; MG/100ML
INJECTION, SOLUTION INTRAVENOUS CONTINUOUS
Status: DISCONTINUED | OUTPATIENT
Start: 2025-03-18 | End: 2025-03-19 | Stop reason: HOSPADM

## 2025-03-18 RX ORDER — ONDANSETRON HYDROCHLORIDE 2 MG/ML
4 INJECTION, SOLUTION INTRAVENOUS
Status: ACTIVE | OUTPATIENT
Start: 2025-03-18 | End: 2025-03-18

## 2025-03-18 RX ORDER — ALUMINUM HYDROXIDE, MAGNESIUM HYDROXIDE, AND SIMETHICONE 1200; 120; 1200 MG/30ML; MG/30ML; MG/30ML
30 SUSPENSION ORAL ONCE
Status: COMPLETED | OUTPATIENT
Start: 2025-03-18 | End: 2025-03-18

## 2025-03-18 RX ORDER — MORPHINE SULFATE 2 MG/ML
2 INJECTION, SOLUTION INTRAMUSCULAR; INTRAVENOUS
Refills: 0 | Status: COMPLETED | OUTPATIENT
Start: 2025-03-18 | End: 2025-03-18

## 2025-03-18 RX ORDER — IBUPROFEN 200 MG
24 TABLET ORAL
Status: DISCONTINUED | OUTPATIENT
Start: 2025-03-18 | End: 2025-03-19 | Stop reason: HOSPADM

## 2025-03-18 RX ORDER — SODIUM,POTASSIUM PHOSPHATES 280-250MG
2 POWDER IN PACKET (EA) ORAL
Status: DISCONTINUED | OUTPATIENT
Start: 2025-03-18 | End: 2025-03-19 | Stop reason: HOSPADM

## 2025-03-18 RX ORDER — GLUCAGON 1 MG
1 KIT INJECTION
Status: DISCONTINUED | OUTPATIENT
Start: 2025-03-18 | End: 2025-03-19 | Stop reason: HOSPADM

## 2025-03-18 RX ORDER — ONDANSETRON HYDROCHLORIDE 2 MG/ML
4 INJECTION, SOLUTION INTRAVENOUS EVERY 6 HOURS PRN
Status: DISCONTINUED | OUTPATIENT
Start: 2025-03-18 | End: 2025-03-19 | Stop reason: HOSPADM

## 2025-03-18 RX ORDER — LIDOCAINE HYDROCHLORIDE 20 MG/ML
15 SOLUTION OROPHARYNGEAL ONCE
Status: COMPLETED | OUTPATIENT
Start: 2025-03-18 | End: 2025-03-18

## 2025-03-18 RX ORDER — DICYCLOMINE HYDROCHLORIDE 10 MG/1
20 CAPSULE ORAL
Status: COMPLETED | OUTPATIENT
Start: 2025-03-18 | End: 2025-03-18

## 2025-03-18 RX ORDER — NALOXONE HCL 0.4 MG/ML
0.02 VIAL (ML) INJECTION
Status: DISCONTINUED | OUTPATIENT
Start: 2025-03-18 | End: 2025-03-19 | Stop reason: HOSPADM

## 2025-03-18 RX ORDER — LANOLIN ALCOHOL/MO/W.PET/CERES
800 CREAM (GRAM) TOPICAL
Status: DISCONTINUED | OUTPATIENT
Start: 2025-03-18 | End: 2025-03-19 | Stop reason: HOSPADM

## 2025-03-18 RX ORDER — HYDROCODONE BITARTRATE AND ACETAMINOPHEN 5; 325 MG/1; MG/1
1 TABLET ORAL EVERY 6 HOURS PRN
Refills: 0 | Status: DISCONTINUED | OUTPATIENT
Start: 2025-03-18 | End: 2025-03-19 | Stop reason: HOSPADM

## 2025-03-18 RX ORDER — TALC
6 POWDER (GRAM) TOPICAL NIGHTLY PRN
Status: DISCONTINUED | OUTPATIENT
Start: 2025-03-18 | End: 2025-03-19 | Stop reason: HOSPADM

## 2025-03-18 RX ADMIN — MORPHINE SULFATE 2 MG: 2 INJECTION, SOLUTION INTRAMUSCULAR; INTRAVENOUS at 12:03

## 2025-03-18 RX ADMIN — DICYCLOMINE HYDROCHLORIDE 20 MG: 10 CAPSULE ORAL at 12:03

## 2025-03-18 RX ADMIN — PANTOPRAZOLE SODIUM 40 MG: 40 INJECTION, POWDER, LYOPHILIZED, FOR SOLUTION INTRAVENOUS at 02:03

## 2025-03-18 RX ADMIN — ONDANSETRON 4 MG: 2 INJECTION INTRAMUSCULAR; INTRAVENOUS at 10:03

## 2025-03-18 RX ADMIN — PANTOPRAZOLE SODIUM 40 MG: 40 INJECTION, POWDER, FOR SOLUTION INTRAVENOUS at 10:03

## 2025-03-18 RX ADMIN — LIDOCAINE HYDROCHLORIDE 15 ML: 20 SOLUTION ORAL at 12:03

## 2025-03-18 RX ADMIN — HYDROCODONE BITARTRATE AND ACETAMINOPHEN 1 TABLET: 5; 325 TABLET ORAL at 09:03

## 2025-03-18 RX ADMIN — SODIUM CHLORIDE, POTASSIUM CHLORIDE, SODIUM LACTATE AND CALCIUM CHLORIDE: 600; 310; 30; 20 INJECTION, SOLUTION INTRAVENOUS at 10:03

## 2025-03-18 RX ADMIN — PROMETHAZINE HYDROCHLORIDE 12.5 MG: 25 INJECTION INTRAMUSCULAR; INTRAVENOUS at 12:03

## 2025-03-18 RX ADMIN — SODIUM CHLORIDE 1000 ML: 9 INJECTION, SOLUTION INTRAVENOUS at 09:03

## 2025-03-18 RX ADMIN — SODIUM CHLORIDE, POTASSIUM CHLORIDE, SODIUM LACTATE AND CALCIUM CHLORIDE: 600; 310; 30; 20 INJECTION, SOLUTION INTRAVENOUS at 02:03

## 2025-03-18 RX ADMIN — ALUMINUM HYDROXIDE, MAGNESIUM HYDROXIDE, AND SIMETHICONE 30 ML: 1200; 120; 1200 SUSPENSION ORAL at 12:03

## 2025-03-18 NOTE — ASSESSMENT & PLAN NOTE
Hyponatremia is likely due to Dehydration/hypovolemia. The patient's most recent sodium results are listed below.  Recent Labs     03/18/25  0949   *     Plan  - Correct the sodium by 4-6mEq in 24 hours.   - Obtain the following studies: TSH, T4.  - Will treat the hyponatremia with IV fluids as follows:  cc/hr  - Monitor sodium Daily.   - Patient hyponatremia is stable  -

## 2025-03-18 NOTE — H&P
[facility] Medicine  History & Physical    Patient Name: Mak Garcia  MRN: [id number]  Patient Class: OP- Observation  Admission Date: 3/18/2025  Attending Physician: Seema Ramos MD   Primary Care Provider: Nicolette Primary Doctor         Patient information was obtained from patient and ER records.     Subjective:     Principal Problem:Intractable nausea and vomiting    Chief Complaint:   Chief Complaint   Patient presents with    Nausea    Vomiting    Abdominal Pain     Increased dose of Mounjaro to 5mg yesterday and N/V and Abd pain since increase        HPI: Mak Garcia is a 24-year-old female with PMHx of ADHD and obesity. She presented to the ED from urgent care with nausea and vomiting onset yesterday around 2 PM. She reports it began soon after administering mounjaro, dose was increased from 2.5 to 5 this week. Has had multiple episodes of vomiting and associated non-radiating abdominal pain. Previously has had nausea/vomiting with the injections, but did not last more than a few hours. Denies fever, constipation, diarrhea, or any urinary symptoms.    In the ED lipase was she was 79. CT abdomen w/o contrast showed a small amount of free fluid in the pelvic cul de sac. Otherwise negative CT of the abdomen and pelvis. She has received multiple doses of zofran with little relief from nausea.    Past Medical History:   Diagnosis Date    ADHD (attention deficit hyperactivity disorder) 9/11/2012    Med check due 10/30/16 Vyvanse 40 add       Past Surgical History:   Procedure Laterality Date    NO PAST SURGERIES         Review of patient's allergies indicates:   Allergen Reactions    Iodine Anaphylaxis       No current facility-administered medications on file prior to encounter.     Current Outpatient Medications on File Prior to Encounter   Medication Sig    [DISCONTINUED] dextroamphetamine-amphetamine (ADDERALL) 10 mg Tab 1/2 tab daily at noon and 1/2 tablet at 4pm  (Patient not taking: Reported on 3/18/2025)    [DISCONTINUED] ibuprofen (ADVIL,MOTRIN) 800 MG tablet Take 1 tablet (800 mg total) by mouth every 8 (eight) hours as needed for Pain. (Patient not taking: Reported on 3/18/2025)    [DISCONTINUED] lisdexamfetamine (VYVANSE) 30 MG capsule Take 1 capsule (30 mg total) by mouth every morning. (Patient not taking: Reported on 3/18/2025)    [DISCONTINUED] ranitidine (ZANTAC) 150 MG tablet Take 1 tablet (150 mg total) by mouth 2 (two) times daily as needed for Heartburn. (Patient not taking: No sig reported)     Family History       Problem Relation (Age of Onset)    No Known Problems Mother, Father          Tobacco Use    Smoking status: Never    Smokeless tobacco: Not on file   Substance and Sexual Activity    Alcohol use: No    Drug use: No    Sexual activity: Never     Review of Systems   Constitutional:  Positive for chills. Negative for fever.   Respiratory:  Negative for shortness of breath.    Cardiovascular:  Negative for chest pain.   Gastrointestinal:  Positive for abdominal pain, nausea and vomiting. Negative for constipation and diarrhea.   Genitourinary:  Negative for dysuria and pelvic pain.   Musculoskeletal:  Negative for back pain.   Psychiatric/Behavioral:  The patient is nervous/anxious.      Objective:     Vital Signs (Most Recent):  Temp: 98 °F (36.7 °C) (03/18/25 0856)  Pulse: 103 (03/18/25 1230)  Resp: 17 (03/18/25 1220)  BP: (!) 101/59 (03/18/25 1230)  SpO2: 99 % (03/18/25 1230) Vital Signs (24h Range):  Temp:  [97.9 °F (36.6 °C)-98 °F (36.7 °C)] 98 °F (36.7 °C)  Pulse:  [] 103  Resp:  [17-20] 17  SpO2:  [98 %-100 %] 99 %  BP: ()/(53-83) 101/59     Weight: 80.7 kg (178 lb)  Body mass index is 35.95 kg/m².     Physical Exam  Vitals and nursing note reviewed.   Constitutional:       Appearance: She is obese.   Eyes:      Extraocular Movements: Extraocular movements intact.   Cardiovascular:      Rate and Rhythm: Regular rhythm. Tachycardia  present.   Pulmonary:      Effort: Pulmonary effort is normal.      Breath sounds: Normal breath sounds.   Abdominal:      General: Bowel sounds are normal.      Palpations: Abdomen is soft.      Tenderness: There is no abdominal tenderness. There is no guarding. Negative signs include Hernandez's sign.   Neurological:      General: No focal deficit present.      Mental Status: She is alert and oriented to person, place, and time.   Psychiatric:      Comments: Anxious and tearful on exam                Significant Labs: All pertinent labs within the past 24 hours have been reviewed.  Recent Lab Results         03/18/25  0949   03/18/25  0942        Albumin 4.3         ALP 82         ALT 10         Anion Gap 11         Appearance, UA   Hazy       AST 20         Bacteria, UA   None       Baso # 0.01         Basophil % 0.1         Bilirubin (UA)   Negative       BILIRUBIN TOTAL 0.6  Comment: For infants and newborns, interpretation of results should be based  on gestational age, weight and in agreement with clinical  observations.    Premature Infant recommended reference ranges:  Up to 24 hours.............<8.0 mg/dL  Up to 48 hours............<12.0 mg/dL  3-5 days..................<15.0 mg/dL  6-29 days.................<15.0 mg/dL           BUN 17         Calcium 9.6         Chloride 102         CO2 22         Color, UA   Yellow       Creatinine 0.8         Differential Method Automated         eGFR >60         Eos # 0.0         Eos % 0.0         Glucose 74         Glucose, UA   Negative       Gran # (ANC) 7.8         Gran % 82.6         Hematocrit 42.3         Hemoglobin 13.4         Hepatitis C Ab Negative         HIV 1/2 Ag/Ab Negative         Hyaline Casts, UA   1       Immature Grans (Abs) 0.03  Comment: Mild elevation in immature granulocytes is non specific and   can be seen in a variety of conditions including stress response,   acute inflammation, trauma and pregnancy. Correlation with other   laboratory and  clinical findings is essential.           Immature Granulocytes 0.3         Ketones, UA   2+       Leukocyte Esterase, UA   1+       Lipase 79         Lymph # 0.9         Lymph % 9.6         MCH 27.3         MCHC 31.7         MCV 86         Microscopic Comment   SEE COMMENT  Comment: Other formed elements not mentioned in the report are not   present in the microscopic examination.          Mono # 0.7         Mono % 7.4         MPV 10.3         NITRITE UA   Negative       nRBC 0         Blood, UA   Trace       pH, UA   6.0       Platelet Count 375         Potassium 4.2         hCG Qualitative, Urine   Negative       PROTEIN TOTAL 8.8         Protein, UA   1+  Comment: Recommend a 24 hour urine protein or a urine   protein/creatinine ratio if globulin induced proteinuria is  clinically suspected.          Acceptable   Yes       RBC 4.90         RBC, UA   5       RDW 12.2         Sodium 135         Spec Grav UA   >1.030       Specimen UA   Urine, Clean Catch       Squam Epithel, UA   10       Unclassified Crystals   4       UROBILINOGEN UA   Negative       WBC, UA   6       WBC 9.49         Yeast, UA   Rare               Significant Imaging: I have reviewed all pertinent imaging results/findings within the past 24 hours.  Assessment/Plan:     * Intractable nausea and vomiting  Pt on mounjaro with dose increase 3/17. N/V since administration. Lipase mildly elevated - 79. CT with small amount of free fluid in the pelvic cul de sac. Otherwise negative CT of the abdomen and pelvis   -PRN anti-emetics  -pain control  -lipase re-draw in AM      Tachycardia  Patient presented to the hospital heart rate 120s  Suspect sinus tachycardia secondary to dehydration due to nausea and vomiting  Check TSH  Check EKG  Place on telemetry monitoring  IVF for hydration       Elevated lipase  Lipase 78  CT of abdomen and pelvis with no inflammation or swelling or findings to suggest acute pancreatitis  IV fluids  Serial  abdominal exam  Trend lipase  Advance diet as tolerated  Avoid all GLP 1 agonist    Dehydration    Patient presented with non-anion gap metabolic acidosis with intractable nausea and vomiting  Continue IV fluids  Daily BMP    Hyponatremia  Hyponatremia is likely due to Dehydration/hypovolemia. The patient's most recent sodium results are listed below.  Recent Labs     03/18/25  0949   *     Plan  - Correct the sodium by 4-6mEq in 24 hours.   - Obtain the following studies: TSH, T4.  - Will treat the hyponatremia with IV fluids as follows:  cc/hr  - Monitor sodium Daily.   - Patient hyponatremia is stable  -     Obesity (BMI 30-39.9)  Body mass index is 35.93 kg/m². Morbid obesity complicates all aspects of disease management from diagnostic modalities to treatment. Weight loss encouraged and health benefits explained to patient.           VTE Risk Mitigation (From admission, onward)           Ordered     IP VTE HIGH RISK PATIENT  Once         03/18/25 1227     Place sequential compression device  Until discontinued         03/18/25 1227                         On 03/18/2025, patient should be placed in hospital observation services under my care in collaboration with Dr Ramos.           ALVA Ross  Department of Hospital Medicine  [facility]/Surg

## 2025-03-18 NOTE — ASSESSMENT & PLAN NOTE
Body mass index is 35.93 kg/m². Morbid obesity complicates all aspects of disease management from diagnostic modalities to treatment. Weight loss encouraged and health benefits explained to patient.

## 2025-03-18 NOTE — ASSESSMENT & PLAN NOTE
Lipase 78  CT of abdomen and pelvis with no inflammation or swelling or findings to suggest acute pancreatitis  IV fluids  Serial abdominal exam  Trend lipase  Advance diet as tolerated  Avoid all GLP 1 agonist

## 2025-03-18 NOTE — PLAN OF CARE
[facility] - Med/Surg  Initial Discharge Assessment       Primary Care Provider: No, Primary Doctor    Admission Diagnosis: Nausea and vomiting, unspecified vomiting type [R11.2]    Admission Date: 3/18/2025  Expected Discharge Date:     Discharge assessment completed with patient and mother at bedside. Information verified as correct on facesheet. Patient independent in ADLs. No HH/HD/DME at home/coumadin. Discharge plan is home - family to provide transport. No case management needs anticipated at this time. Hospital follow up appointment requested from [facility] clinic - awaiting response    Transition of Care Barriers: None    Payor: [employer] CLAIMS ACCOUNT / Plan: AMBETTER / Product Type: PPO /     Extended Emergency Contact Information  Primary Emergency Contact: RanJuli  Address: [address] [place] [address]           [place] [id number] [facility]  Home Phone: [phone]  Work Phone: [phone]  Mobile Phone: [phone]  Relation: Mother  Secondary Emergency Contact: JoseFreeman joseph  Address: [address] [place] [address]           [place] [id number] [facility]  Home Phone: [phone]  Work Phone: [phone]  Mobile Phone: [phone]  Relation: Father    Discharge Plan A: Home with family  Discharge Plan B: Home      [id number] - [place] - [address] AT [facility] [address]  [address] [address]  Phone: [phone] Fax: [phone]      Initial Assessment (most recent)       Adult Discharge Assessment - 03/18/25 1551          Discharge Assessment    Assessment Type Discharge Planning Assessment     Confirmed/corrected address, phone number and insurance Yes     Confirmed Demographics Correct on Facesheet     Source of Information patient;family     Does patient/caregiver understand observation status Yes     Communicated REMI with patient/caregiver Yes     Reason For Admission N/V     People in Home parent(s)      Facility Arrived From: home     Do you expect to return to your current living situation? Yes     Do you have help at home or someone to help you manage your care at home? Yes     Who are your caregiver(s) and their phone number(s)? independent in care/mother     Prior to hospitilization cognitive status: Alert/Oriented     Current cognitive status: Alert/Oriented     Walking or Climbing Stairs Difficulty no     Dressing/Bathing Difficulty no     Equipment Currently Used at Home none     Readmission within 30 days? No     Patient currently being followed by outpatient case management? No     Do you currently have service(s) that help you manage your care at home? No     Do you take prescription medications? Yes     Do you have prescription coverage? Yes     Coverage [employer]     Do you have any problems affording any of your prescribed medications? No     Is the patient taking medications as prescribed? yes     Who is going to help you get home at discharge? family     How do you get to doctors appointments? car, drives self     Are you on dialysis? No     Do you take coumadin? No     Discharge Plan A Home with family     Discharge Plan B Home     DME Needed Upon Discharge  none     Discharge Plan discussed with: Patient;Parent(s)     Transition of Care Barriers None

## 2025-03-18 NOTE — ASSESSMENT & PLAN NOTE
Patient presented to the hospital heart rate 120s  Suspect sinus tachycardia secondary to dehydration due to nausea and vomiting  -Normal TSH  -Normal EKG  Place on telemetry monitoring  IVF for hydration   Pain control

## 2025-03-18 NOTE — ASSESSMENT & PLAN NOTE
Patient presented with non-anion gap metabolic acidosis with intractable nausea and vomiting  Continue IV fluids  Daily BMP

## 2025-03-18 NOTE — ASSESSMENT & PLAN NOTE
Pt on mounjaro with dose increase 3/17. N/V since administration. Lipase mildly elevated - 79. CT with small amount of free fluid in the pelvic cul de sac. Otherwise negative CT of the abdomen and pelvis   -PRN anti-emetics  -pain control  -lipase re-draw in AM

## 2025-03-18 NOTE — ED PROVIDER NOTES
Encounter Date: 3/18/2025       History     Chief Complaint   Patient presents with    Nausea    Vomiting    Abdominal Pain     Increased dose of Mounjaro to 5mg yesterday and N/V and Abd pain since increase     Patient is a 24 y.o. female who presents to the ED 03/18/2025 with a chief complaint of Nausea and vomiting since 2:00 p.m. yesterday afternoon.  Patient increased her dose of Mounjaro yesterday and since then has had vomiting and severe abdominal pain.  She has not had any fever or diarrhea.  She denies any urinary symptoms.  She denies any other medical problems or history.  Her only abdominal surgery was liposuction many years ago.  Denies pregnancy.  She had a similar episode with Ozempic in the past but it resolved quickly.  She has no history of pancreatitis that she knows of.  She occasionally drinks alcohol but not daily.               Review of patient's allergies indicates:   Allergen Reactions    Iodine Anaphylaxis     Past Medical History:   Diagnosis Date    ADHD (attention deficit hyperactivity disorder) 9/11/2012    Med check due 10/30/16 Vyvanse 40 add     Past Surgical History:   Procedure Laterality Date    NO PAST SURGERIES       Family History   Problem Relation Name Age of Onset    No Known Problems Mother      No Known Problems Father       Social History[1]  Review of Systems   Constitutional:  Negative for chills and fever.   Respiratory:  Negative for chest tightness and shortness of breath.    Cardiovascular:  Negative for chest pain.   Gastrointestinal:  Positive for abdominal pain, nausea and vomiting. Negative for constipation and diarrhea.   Genitourinary:  Negative for dysuria.   Musculoskeletal:  Negative for arthralgias and myalgias.   Skin:  Negative for rash and wound.   Neurological:  Negative for syncope, weakness and numbness.   Hematological:  Does not bruise/bleed easily.       Physical Exam     Initial Vitals [03/18/25 0856]   BP Pulse Resp Temp SpO2   114/76 (!) 111  18 98 °F (36.7 °C) 98 %      MAP       --         Physical Exam    Nursing note and vitals reviewed.  Constitutional: Vital signs are normal. She appears well-developed and well-nourished.   HENT:   Head: Normocephalic and atraumatic.   Eyes: Pupils are equal, round, and reactive to light.   Neck: Neck supple.   Cardiovascular:  Normal rate, regular rhythm, normal heart sounds and intact distal pulses.     Exam reveals no gallop and no friction rub.       No murmur heard.  Pulmonary/Chest: Breath sounds normal. She has no wheezes. She has no rhonchi. She has no rales.   Abdominal: Abdomen is soft. Bowel sounds are normal. She exhibits no distension and no mass. There is abdominal tenderness in the epigastric area. There is no rebound and no guarding.   Musculoskeletal:      Cervical back: Neck supple.     Neurological: She is alert and oriented to person, place, and time. She has normal strength.   Skin: Skin is warm, dry and intact.   Psychiatric: She has a normal mood and affect. Her speech is normal and behavior is normal.         ED Course   Procedures  Labs Reviewed   URINALYSIS, REFLEX TO URINE CULTURE - Abnormal       Result Value    Specimen UA Urine, Clean Catch      Color, UA Yellow      Appearance, UA Hazy (*)     pH, UA 6.0      Specific Gravity, UA >1.030 (*)     Protein, UA 1+ (*)     Glucose, UA Negative      Ketones, UA 2+ (*)     Bilirubin (UA) Negative      Occult Blood UA Trace (*)     Nitrite, UA Negative      Urobilinogen, UA Negative      Leukocytes, UA 1+ (*)     Narrative:     Specimen Source->Urine   CBC W/ AUTO DIFFERENTIAL - Abnormal    WBC 9.49      RBC 4.90      Hemoglobin 13.4      Hematocrit 42.3      MCV 86      MCH 27.3      MCHC 31.7 (*)     RDW 12.2      Platelets 375      MPV 10.3      Immature Granulocytes 0.3      Gran # (ANC) 7.8 (*)     Immature Grans (Abs) 0.03      Lymph # 0.9 (*)     Mono # 0.7      Eos # 0.0      Baso # 0.01      nRBC 0      Gran % 82.6 (*)     Lymph % 9.6  (*)     Mono % 7.4      Eosinophil % 0.0      Basophil % 0.1      Differential Method Automated      Narrative:     Release to patient->Immediate   COMPREHENSIVE METABOLIC PANEL - Abnormal    Sodium 135 (*)     Potassium 4.2      Chloride 102      CO2 22 (*)     Glucose 74      BUN 17      Creatinine 0.8      Calcium 9.6      Total Protein 8.8 (*)     Albumin 4.3      Total Bilirubin 0.6      Alkaline Phosphatase 82      AST 20      ALT 10      eGFR >60      Anion Gap 11      Narrative:     Release to patient->Immediate   LIPASE - Abnormal    Lipase 79 (*)     Narrative:     Release to patient->Immediate   URINALYSIS MICROSCOPIC - Abnormal    RBC, UA 5 (*)     WBC, UA 6 (*)     Bacteria None      Yeast, UA Rare (*)     Squam Epithel, UA 10      Hyaline Casts, UA 1      Unclass Mary UA 4      Microscopic Comment SEE COMMENT      Narrative:     Specimen Source->Urine   HEPATITIS C ANTIBODY    Hepatitis C Ab Negative      Narrative:     Release to patient->Immediate   HIV 1 / 2 ANTIBODY    HIV 1/2 Ag/Ab Negative      Narrative:     Release to patient->Immediate   POCT URINE PREGNANCY    POC Preg Test, Ur Negative       Acceptable Yes            Imaging Results              CT Abdomen Pelvis  Without Contrast (Final result)  Result time 03/18/25 11:40:05      Final result by Enrique aMnriquez Jr., MD (03/18/25 11:40:05)                   Impression:      Small amount of free fluid is identified in the pelvic cul de sac.  Otherwise negative CT of the abdomen and pelvis.      Electronically signed by: Enrique Manriquez MD  Date:    03/18/2025  Time:    11:40               Narrative:    EXAMINATION:  CT ABDOMEN PELVIS WITHOUT CONTRAST    CLINICAL HISTORY:  Abdominal pain, acute, nonlocalized;    TECHNIQUE:  Low dose axial images, sagittal and coronal reformations were obtained from the lung bases to the pubic symphysis.    COMPARISON:  None    FINDINGS:  The liver is of normal size contour and CT density  without focal mass.  The gallbladder is of normal size without CT evidence of stone.  The pancreas appears of normal contour and CT density without edema or mass.  The spleen is of normal size and CT density.    The adrenal glands are not enlarged.  The kidneys are of normal size contour and CT density.  Stone or hydronephrosis is not seen.  The abdominal aorta and inferior vena cava are of normal caliber.    The stomach is of normal configuration.  Small bowel dilatation or air-fluid levels are not seen.  The colon appears of normal configuration without distention or mass.  A normal appendix is noted.  Free fluid or free air is not identified.    The bladder is of normal contour without mass or asymmetry.  The uterus is not enlarged.  There is a small amount of free fluid in the cul de sac.                                       Medications   ondansetron injection 4 mg (4 mg Intravenous Not Given 3/18/25 1220)   melatonin tablet 6 mg (has no administration in time range)   ondansetron injection 4 mg (has no administration in time range)   senna-docusate 8.6-50 mg per tablet 1 tablet (has no administration in time range)   acetaminophen tablet 650 mg (has no administration in time range)   aluminum-magnesium hydroxide-simethicone 200-200-20 mg/5 mL suspension 30 mL (has no administration in time range)   acetaminophen tablet 650 mg (has no administration in time range)   HYDROcodone-acetaminophen 5-325 mg per tablet 1 tablet (has no administration in time range)   naloxone 0.4 mg/mL injection 0.02 mg (has no administration in time range)   potassium bicarbonate disintegrating tablet 50 mEq (has no administration in time range)   potassium bicarbonate disintegrating tablet 35 mEq (has no administration in time range)   potassium bicarbonate disintegrating tablet 60 mEq (has no administration in time range)   magnesium oxide tablet 800 mg (has no administration in time range)   magnesium oxide tablet 800 mg (has no  administration in time range)   potassium, sodium phosphates 280-160-250 mg packet 2 packet (has no administration in time range)   potassium, sodium phosphates 280-160-250 mg packet 2 packet (has no administration in time range)   potassium, sodium phosphates 280-160-250 mg packet 2 packet (has no administration in time range)   glucose chewable tablet 16 g (has no administration in time range)   glucose chewable tablet 24 g (has no administration in time range)   glucagon (human recombinant) injection 1 mg (has no administration in time range)   lactated ringers infusion ( Intravenous New Bag 3/18/25 1418)   promethazine (PHENERGAN) 12.5 mg in 0.9% NaCl 50 mL IVPB (0 mg Intravenous Stopped 3/18/25 1255)   morphine injection 2 mg (has no administration in time range)   dextrose 10% bolus 125 mL 125 mL (has no administration in time range)   dextrose 10% bolus 250 mL 250 mL (has no administration in time range)   LORazepam injection 2 mg (has no administration in time range)   pantoprazole injection 40 mg (40 mg Intravenous Given 3/18/25 1418)   sodium chloride 0.9% bolus 1,000 mL 1,000 mL (0 mLs Intravenous Stopped 3/18/25 1049)   ondansetron injection 4 mg (4 mg Intravenous Given 3/18/25 1034)   pantoprazole injection 40 mg (40 mg Intravenous Given 3/18/25 1034)   dicyclomine capsule 20 mg (20 mg Oral Given 3/18/25 1219)   aluminum-magnesium hydroxide-simethicone 200-200-20 mg/5 mL suspension 30 mL (30 mLs Oral Given 3/18/25 1219)     And   LIDOcaine viscous HCl 2% oral solution 15 mL (15 mLs Oral Given 3/18/25 1219)   morphine injection 2 mg (2 mg Intravenous Given 3/18/25 1220)     Medical Decision Making  Amount and/or Complexity of Data Reviewed  Labs: ordered.  Radiology: ordered.    Risk  OTC drugs.  Prescription drug management.  Decision regarding hospitalization.         APC / Resident Notes:   Patient is a 24 y.o. female who presents to the ED 03/18/2025 who underwent emergent evaluation For abdominal  pain and vomiting after increasing her dose of Mounjaro yesterday.  Patient is not pregnant.  No leukocytosis.  She is tachycardic on arrival.  She is not febrile.  Blood pressure stable.  She has epigastric tenderness on my exam.  No lower abdominal tenderness.  Lipase slightly elevated.  Concerned for early pancreatitis given recent Mounjaro use.  Unable to scan with contrast given anaphylactic history to iodine.  Noncontrast study shows no evidence of bowel obstruction, pancreatitis, acute cholecystitis or other acute findings.  I do not think acute choledocholithiasis or cholangitis.  Normal liver enzymes and bilirubin.  Other labs unremarkable.  Normal renal function.  No severe electrolyte abnormalities.  Given multiple doses antiemetics in the emergency department and still having pain and not tolerating p.o..  Recommended admit for observation for repeat abdominal exams and labs with continued IV hydration.  Case discussed with mildly nurse practitioner of hospital medicine team is accepting of this admission.  Case also discussed with Dr. Latif who is agreeable plan of care.  All findings and plan of care discussed with patient who is also agreeable.  Do not think other emergent intra-abdominal process such as TOA or PID.  I do not think further ultrasound or pelvic exam indicated at this time.  Patient admitted to hospital medicine team in stable condition.       Attending Attestation:     Physician Attestation Statement for NP/PA:       Other NP/PA Attestation Additions:    History of Present Illness: 24-year-old female presents for nausea and vomiting status post increased Mounjaro injection yesterday.    Medical Decision Making: Initial differential diagnosis included but not limited to pancreatitis, electrolyte abnormality, and gastritis.  The patient's labs were significant for a mildly elevated lipase level.  The patient's CT scan shows no acute abnormalities.  The patient may have an early  pancreatitis starting.  She will be admitted to Hospital Medicine for further care.  I am in agreement with the nurse practitioner's assessment, treatment, and plan of care.                        Medical Decision Making:   Differential Diagnosis:   Pancreatitis  Bowel obstruction  Ileus  dehydration             Clinical Impression:  Final diagnoses:  [R11.2] Nausea and vomiting, unspecified vomiting type (Primary)          ED Disposition Condition    Admit                   Elaine Good NP  03/18/25 1206         [1]   Social History  Tobacco Use    Smoking status: Never   Substance Use Topics    Alcohol use: No    Drug use: No        Wolfgang Latif MD  03/18/25 1500

## 2025-03-18 NOTE — HPI
Mak Garcia is a 24-year-old female with PMHx of ADHD and obesity. She presented to the ED from urgent care with nausea and vomiting onset yesterday around 2 PM. She reports it began soon after administering mounjaro, dose was increased from 2.5 to 5 this week. Has had multiple episodes of vomiting and associated non-radiating abdominal pain. Previously has had nausea/vomiting with the injections, but did not last more than a few hours. Denies fever, constipation, diarrhea, or any urinary symptoms.    In the ED lipase was she was 79. CT abdomen w/o contrast showed a small amount of free fluid in the pelvic cul de sac. Otherwise negative CT of the abdomen and pelvis. She has received multiple doses of zofran with little relief from nausea.

## 2025-03-19 VITALS
OXYGEN SATURATION: 100 % | WEIGHT: 178 LBS | HEIGHT: 59 IN | TEMPERATURE: 98 F | SYSTOLIC BLOOD PRESSURE: 104 MMHG | HEART RATE: 75 BPM | DIASTOLIC BLOOD PRESSURE: 66 MMHG | RESPIRATION RATE: 16 BRPM | BODY MASS INDEX: 35.88 KG/M2

## 2025-03-19 PROBLEM — I95.9 HYPOTENSION: Status: ACTIVE | Noted: 2025-03-19

## 2025-03-19 LAB
ALBUMIN SERPL BCP-MCNC: 3.2 G/DL (ref 3.5–5.2)
ALP SERPL-CCNC: 60 U/L (ref 40–150)
ALT SERPL W/O P-5'-P-CCNC: 7 U/L (ref 10–44)
ANION GAP SERPL CALC-SCNC: 7 MMOL/L (ref 8–16)
AST SERPL-CCNC: 15 U/L (ref 10–40)
BASOPHILS # BLD AUTO: 0.01 K/UL (ref 0–0.2)
BASOPHILS NFR BLD: 0.2 % (ref 0–1.9)
BILIRUB SERPL-MCNC: 0.6 MG/DL (ref 0.1–1)
BUN SERPL-MCNC: 11 MG/DL (ref 6–20)
CALCIUM SERPL-MCNC: 8.2 MG/DL (ref 8.7–10.5)
CHLORIDE SERPL-SCNC: 106 MMOL/L (ref 95–110)
CO2 SERPL-SCNC: 22 MMOL/L (ref 23–29)
CREAT SERPL-MCNC: 0.7 MG/DL (ref 0.5–1.4)
DIFFERENTIAL METHOD BLD: ABNORMAL
EOSINOPHIL # BLD AUTO: 0.2 K/UL (ref 0–0.5)
EOSINOPHIL NFR BLD: 3.2 % (ref 0–8)
ERYTHROCYTE [DISTWIDTH] IN BLOOD BY AUTOMATED COUNT: 12.2 % (ref 11.5–14.5)
EST. GFR  (NO RACE VARIABLE): >60 ML/MIN/1.73 M^2
GLUCOSE SERPL-MCNC: 74 MG/DL (ref 70–110)
HCT VFR BLD AUTO: 34 % (ref 37–48.5)
HGB BLD-MCNC: 10.9 G/DL (ref 12–16)
IMM GRANULOCYTES # BLD AUTO: 0.02 K/UL (ref 0–0.04)
IMM GRANULOCYTES NFR BLD AUTO: 0.4 % (ref 0–0.5)
LIPASE SERPL-CCNC: 22 U/L (ref 4–60)
LYMPHOCYTES # BLD AUTO: 2.4 K/UL (ref 1–4.8)
LYMPHOCYTES NFR BLD: 43 % (ref 18–48)
MAGNESIUM SERPL-MCNC: 1.8 MG/DL (ref 1.6–2.6)
MCH RBC QN AUTO: 27.7 PG (ref 27–31)
MCHC RBC AUTO-ENTMCNC: 32.1 G/DL (ref 32–36)
MCV RBC AUTO: 87 FL (ref 82–98)
MONOCYTES # BLD AUTO: 0.6 K/UL (ref 0.3–1)
MONOCYTES NFR BLD: 11.1 % (ref 4–15)
NEUTROPHILS # BLD AUTO: 2.4 K/UL (ref 1.8–7.7)
NEUTROPHILS NFR BLD: 42.1 % (ref 38–73)
NRBC BLD-RTO: 0 /100 WBC
PLATELET # BLD AUTO: 288 K/UL (ref 150–450)
PMV BLD AUTO: 10.1 FL (ref 9.2–12.9)
POTASSIUM SERPL-SCNC: 3.8 MMOL/L (ref 3.5–5.1)
PROT SERPL-MCNC: 6.5 G/DL (ref 6–8.4)
RBC # BLD AUTO: 3.93 M/UL (ref 4–5.4)
SODIUM SERPL-SCNC: 135 MMOL/L (ref 136–145)
WBC # BLD AUTO: 5.6 K/UL (ref 3.9–12.7)

## 2025-03-19 PROCEDURE — 80053 COMPREHEN METABOLIC PANEL: CPT | Performed by: NURSE PRACTITIONER

## 2025-03-19 PROCEDURE — 25000003 PHARM REV CODE 250: Performed by: NURSE PRACTITIONER

## 2025-03-19 PROCEDURE — 83690 ASSAY OF LIPASE: CPT | Performed by: NURSE PRACTITIONER

## 2025-03-19 PROCEDURE — 63600175 PHARM REV CODE 636 W HCPCS: Performed by: NURSE PRACTITIONER

## 2025-03-19 PROCEDURE — G0378 HOSPITAL OBSERVATION PER HR: HCPCS

## 2025-03-19 PROCEDURE — 36415 COLL VENOUS BLD VENIPUNCTURE: CPT | Performed by: NURSE PRACTITIONER

## 2025-03-19 PROCEDURE — 85025 COMPLETE CBC W/AUTO DIFF WBC: CPT | Performed by: NURSE PRACTITIONER

## 2025-03-19 PROCEDURE — 83735 ASSAY OF MAGNESIUM: CPT | Performed by: NURSE PRACTITIONER

## 2025-03-19 RX ORDER — PANTOPRAZOLE SODIUM 40 MG/1
40 TABLET, DELAYED RELEASE ORAL DAILY
Qty: 30 TABLET | Refills: 0 | Status: SHIPPED | OUTPATIENT
Start: 2025-03-19 | End: 2026-03-19

## 2025-03-19 RX ORDER — ONDANSETRON 4 MG/1
4 TABLET, ORALLY DISINTEGRATING ORAL EVERY 6 HOURS PRN
Qty: 20 TABLET | Refills: 0 | Status: SHIPPED | OUTPATIENT
Start: 2025-03-19

## 2025-03-19 RX ORDER — SUCRALFATE 1 G/10ML
1 SUSPENSION ORAL EVERY 6 HOURS
Status: DISCONTINUED | OUTPATIENT
Start: 2025-03-19 | End: 2025-03-19 | Stop reason: HOSPADM

## 2025-03-19 RX ORDER — SUCRALFATE 1 G/1
1 TABLET ORAL 4 TIMES DAILY
Qty: 40 TABLET | Refills: 0 | Status: SHIPPED | OUTPATIENT
Start: 2025-03-19

## 2025-03-19 RX ADMIN — PANTOPRAZOLE SODIUM 40 MG: 40 INJECTION, POWDER, LYOPHILIZED, FOR SOLUTION INTRAVENOUS at 09:03

## 2025-03-19 RX ADMIN — SODIUM CHLORIDE, POTASSIUM CHLORIDE, SODIUM LACTATE AND CALCIUM CHLORIDE: 600; 310; 30; 20 INJECTION, SOLUTION INTRAVENOUS at 05:03

## 2025-03-19 RX ADMIN — SODIUM CHLORIDE 500 ML: 9 INJECTION, SOLUTION INTRAVENOUS at 04:03

## 2025-03-19 RX ADMIN — SUCRALFATE ORAL SUSPENSION 1 G: 1 SUSPENSION ORAL at 02:03

## 2025-03-19 RX ADMIN — ONDANSETRON 4 MG: 2 INJECTION INTRAMUSCULAR; INTRAVENOUS at 02:03

## 2025-03-19 RX ADMIN — SODIUM CHLORIDE, POTASSIUM CHLORIDE, SODIUM LACTATE AND CALCIUM CHLORIDE: 600; 310; 30; 20 INJECTION, SOLUTION INTRAVENOUS at 09:03

## 2025-03-19 RX ADMIN — Medication 800 MG: at 09:03

## 2025-03-19 RX ADMIN — POTASSIUM BICARBONATE 50 MEQ: 977.5 TABLET, EFFERVESCENT ORAL at 09:03

## 2025-03-19 RX ADMIN — HYDROCODONE BITARTRATE AND ACETAMINOPHEN 1 TABLET: 5; 325 TABLET ORAL at 09:03

## 2025-03-19 NOTE — ASSESSMENT & PLAN NOTE
Patient presented with non-anion gap metabolic acidosis with intractable nausea and vomiting  -Continue IV fluids  -Daily BMP  -Orthostatics

## 2025-03-19 NOTE — ASSESSMENT & PLAN NOTE
Hyponatremia is likely due to Dehydration/hypovolemia. The patient's most recent sodium results are listed below.  Recent Labs     03/18/25  0949 03/19/25  0452   * 135*     Plan  - Correct the sodium by 4-6mEq in 24 hours.   - TSH WNL  - Will treat the hyponatremia with IV fluids as follows:  cc/hr  - Monitor sodium Daily.   - Patient hyponatremia is stable

## 2025-03-19 NOTE — NURSING
0405 Patient is hypotensive 86/46.  She is in bed with eyes closed without complaints at this time.  ALEYDA Ireland notified.

## 2025-03-19 NOTE — NURSING
She tolerated food and liquids well. She ask to walk the alvarez. When she was putting slippers on she laid across the bed and stated her stomach was hurting. I instructed her to just lye back down and rest. Her BP was 108/59 sitting and HR 81.

## 2025-03-19 NOTE — ASSESSMENT & PLAN NOTE
Lipase 78; subsequent 22  CT of abdomen and pelvis with no inflammation or swelling or findings to suggest acute pancreatitis  IV fluids  Abdominal US  Serial abdominal exam  Advance diet as tolerated  -tolerated liquid diet, continue to advance  Avoid all GLP 1 agonist

## 2025-03-19 NOTE — PLAN OF CARE
Pt clear for DC from case management standpoint. Discharging to home.    Pending tolerating oral intake without vomiting this evening.       03/19/25 1506   Final Note   Assessment Type Final Discharge Note   Anticipated Discharge Disposition Home

## 2025-03-19 NOTE — ASSESSMENT & PLAN NOTE
Hypotension noted early morning hours 3/19. Pt is asymptomatic at this time.  -IVF  -Orthostatics  -PO hydration as tolerated

## 2025-03-19 NOTE — ASSESSMENT & PLAN NOTE
Pt on mounjaro with dose increase 3/17. N/V since administration. Lipase mildly elevated - 79. CT with small amount of free fluid in the pelvic cul de sac. Otherwise negative CT of the abdomen and pelvis   -PRN anti-emetics  -pain control  -repeat lipase 22  -protonix  -sucralfate

## 2025-03-19 NOTE — NURSING
Per ALEYDA Blackmon, Orthostatic V/S taken Documented in chart.      Lying 90/43  Sitting 105/57   Standing 96/58

## 2025-03-19 NOTE — HOSPITAL COURSE
Mak Garcia was admitted for intractable nausea and vomiting after increased dose of mounjaro. Her vomiting was controlled with PRN anti-emetics. Tachycardia present on admission resolved with IV fluids. Abdominal workup was negative with negative CT abdomen and US abdomen limited. Negative repeat lipase. With some hypotension early AM 3/19, but pt was asymptomatic at this time with negative orthostatics. After chart review pt regularly Systolic <110. Tolerating diet well. Pt was educated on importance of small portions and bland diet and stopping her mounjaro. While in the hospital she ate a full plate of spaghetti resulting in nausea but no vomiting. Pt received carafate and anti-emetic in the early afternoon. She was reassessed and has not had any vomiting or dizziness since admission and her nausea is well controlled with PRN zofran. She is able to tolerate liquids PO and was deemed ready for discharge.  She was D/C home with instructions to stop the Mounjaro, as well as course of Protonix and Carafate for suspected gastritis.  She was sent home with antiemetics as well.  Return precautions were provided.    Pt seen on day of discharge and deemed appropriate for discharge.

## 2025-03-19 NOTE — SUBJECTIVE & OBJECTIVE
Interval History: Pt seen with mother at bedside. They are concerned about her low BP ON. She reports that when low BPs were recorded she was asleep, but upon waking had no lightheadedness/dizziness when walking to the restroom. Was able to tolerate liquid diet yesterday afternoon, but is not interested in breakfast this AM. Also reporting no BMs since prior to onset of n/v, states she is regular w/ multiple daily. CT with no stool burden.     Review of Systems   Constitutional:  Negative for appetite change, fatigue and fever.   Gastrointestinal:  Positive for abdominal pain, constipation and nausea. Negative for vomiting.        Belching   Musculoskeletal:  Positive for back pain.   Neurological:  Negative for dizziness and light-headedness.   Psychiatric/Behavioral:  Negative for confusion. The patient is not nervous/anxious.      Objective:     Vital Signs (Most Recent):  Temp: 98.2 °F (36.8 °C) (03/19/25 0719)  Pulse: 91 (03/19/25 0719)  Resp: 17 (03/19/25 0719)  BP: (!) 110/58 (03/19/25 0719)  SpO2: 98 % (03/19/25 0719) Vital Signs (24h Range):  Temp:  [98 °F (36.7 °C)-98.8 °F (37.1 °C)] 98.2 °F (36.8 °C)  Pulse:  [] 91  Resp:  [14-20] 17  SpO2:  [98 %-100 %] 98 %  BP: ()/(49-79) 110/58     Weight: 80.7 kg (178 lb)  Body mass index is 35.93 kg/m².    Intake/Output Summary (Last 24 hours) at 3/19/2025 0833  Last data filed at 3/18/2025 1800  Gross per 24 hour   Intake 1650 ml   Output 750 ml   Net 900 ml         Physical Exam  Vitals and nursing note reviewed.   Constitutional:       Appearance: She is obese.   Eyes:      Extraocular Movements: Extraocular movements intact.   Cardiovascular:      Rate and Rhythm: Normal rate and regular rhythm.   Pulmonary:      Effort: Pulmonary effort is normal.      Breath sounds: Normal breath sounds.   Abdominal:      General: Bowel sounds are normal.      Palpations: Abdomen is soft.      Tenderness: There is abdominal tenderness (RLQ, LUQ).   Neurological:       Mental Status: She is alert and oriented to person, place, and time.   Psychiatric:         Mood and Affect: Mood normal.               Significant Labs: All pertinent labs within the past 24 hours have been reviewed.  Recent Lab Results         03/19/25  0452   03/18/25  0949   03/18/25  0942        Albumin 3.2   4.3         ALP 60   82         ALT 7   10         Anion Gap 7   11         Appearance, UA     Hazy       AST 15   20         Bacteria, UA     None       Baso # 0.01   0.01         Basophil % 0.2   0.1         Bilirubin (UA)     Negative       BILIRUBIN TOTAL 0.6  Comment: For infants and newborns, interpretation of results should be based  on gestational age, weight and in agreement with clinical  observations.    Premature Infant recommended reference ranges:  Up to 24 hours.............<8.0 mg/dL  Up to 48 hours............<12.0 mg/dL  3-5 days..................<15.0 mg/dL  6-29 days.................<15.0 mg/dL     0.6  Comment: For infants and newborns, interpretation of results should be based  on gestational age, weight and in agreement with clinical  observations.    Premature Infant recommended reference ranges:  Up to 24 hours.............<8.0 mg/dL  Up to 48 hours............<12.0 mg/dL  3-5 days..................<15.0 mg/dL  6-29 days.................<15.0 mg/dL           BUN 11   17         Calcium 8.2   9.6         Chloride 106   102         CO2 22   22         Color, UA     Yellow       Creatinine 0.7   0.8         Differential Method Automated   Automated         eGFR >60   >60         Eos # 0.2   0.0         Eos % 3.2   0.0         Glucose 74   74         Glucose, UA     Negative       Gran # (ANC) 2.4   7.8         Gran % 42.1   82.6         Hematocrit 34.0   42.3         Hemoglobin 10.9   13.4         Hepatitis C Ab   Negative         HIV 1/2 Ag/Ab   Negative         Hyaline Casts, UA     1       Immature Grans (Abs) 0.02  Comment: Mild elevation in immature granulocytes is non specific  and   can be seen in a variety of conditions including stress response,   acute inflammation, trauma and pregnancy. Correlation with other   laboratory and clinical findings is essential.     0.03  Comment: Mild elevation in immature granulocytes is non specific and   can be seen in a variety of conditions including stress response,   acute inflammation, trauma and pregnancy. Correlation with other   laboratory and clinical findings is essential.           Immature Granulocytes 0.4   0.3         Ketones, UA     2+       Leukocyte Esterase, UA     1+       Lipase 22   79         Lymph # 2.4   0.9         Lymph % 43.0   9.6         Magnesium  1.8   1.9         MCH 27.7   27.3         MCHC 32.1   31.7         MCV 87   86         Microscopic Comment     SEE COMMENT  Comment: Other formed elements not mentioned in the report are not   present in the microscopic examination.          Mono # 0.6   0.7         Mono % 11.1   7.4         MPV 10.1   10.3         NITRITE UA     Negative       nRBC 0   0         Blood, UA     Trace       pH, UA     6.0       Phosphorus Level   3.1         Platelet Count 288   375         Potassium 3.8   4.2         hCG Qualitative, Urine     Negative       PROTEIN TOTAL 6.5   8.8         Protein, UA     1+  Comment: Recommend a 24 hour urine protein or a urine   protein/creatinine ratio if globulin induced proteinuria is  clinically suspected.          Acceptable     Yes       RBC 3.93   4.90         RBC, UA     5       RDW 12.2   12.2         Sodium 135   135         Spec Grav UA     >1.030       Specimen UA     Urine, Clean Catch       Squam Epithel, UA     10       TSH   0.640         Unclassified Crystals     4       UROBILINOGEN UA     Negative       WBC, UA     6       WBC 5.60   9.49         Yeast, UA     Rare               Significant Imaging: I have reviewed all pertinent imaging results/findings within the past 24 hours.

## 2025-03-19 NOTE — DISCHARGE INSTRUCTIONS
Stop Mounjaro, you may be nauseous for up to 7 days following your last dose.  Small meals, maintain good hydration.  [place] foods only.    Discharge Instructions, [facility] Medicine    Thank you for choosing [facility] for your medical care. The primary doctor who is taking care of you at the time of your discharge is Seema Ramos MD.     You were admitted to the hospital with Intractable nausea and vomiting.     Please note your discharge instructions, including diet/activity restrictions, follow-up appointments, and medication changes.  If you have any questions about your medical issues, prescriptions, or any other questions, please feel free to contact the [facility] Medicine Dept at [phone] and we will help.    If you are previously with Home health, outpatient PT/OT or under a therapy program, you are cleared to return to those programs.    Please direct all long term medication refills and follow up to your primary care provider, No, Primary Doctor. Thank you again for letting us take care of your health care needs.    Please note the following discharge instructions per your discharging physician-  Elaine Blackmon NP    .Our goal at [facility] is to always give you outstanding care and exceptional service. You may receive a survey from [employer] by mail, text or e-mail in the next 24-48 hours asking about the care you received with us. The survey should only take 5-10 minutes to complete and is very important to us.     Your feedback provides us with a way to recognize our staff who work tirelessly to provide the best care! Also, your responses help us learn how to improve when your experience was below our aspiration of excellence. We are always looking for ways to improve your stay. We WILL use your feedback to continue making improvements to help us provide the highest quality care. We keep your personal information and feedback confidential. We appreciate  your time completing this survey and can't wait to hear from you!!!    We look forward to your continued care with us! Thanks so much for choosing [facility] for your healthcare needs!

## 2025-03-19 NOTE — PLAN OF CARE
Plan of care reviewed with patient. Patient verbalized complete understanding. Two hour patient rounding maintained throughout shift. All fall precautions maintained. Bed in lowest position, locked, call light in reach. Side rails up x 2. Slip resistant socks maintained. Needs attended to.     Problem: Adult Inpatient Plan of Care  Goal: Plan of Care Review  Outcome: Met  Goal: Patient-Specific Goal (Individualized)  Outcome: Met  Goal: Absence of Hospital-Acquired Illness or Injury  Outcome: Met  Goal: Optimal Comfort and Wellbeing  Outcome: Met  Goal: Readiness for Transition of Care  Outcome: Met

## 2025-03-19 NOTE — NURSING
Patient's mother at bedside asking to not disturb for vital signs at this time. Patient is finally resting

## 2025-03-20 NOTE — DISCHARGE SUMMARY
[facility]  Discharge Summary      Patient Name: Mak Garcia  MRN: [id number]  WESLEY: [id number]  Patient Class: OP- Observation  Admission Date: 3/18/2025  Hospital Length of Stay: 1 days  Discharge Date and Time: 3/19/2025  6:28 PM  Attending Physician: Nicolette att. providers found   Discharging Provider: Elaine Blackmon NP  Primary Care Provider: Nicolette, Primary Doctor    Primary Care Team: Networked reference to record PCT     HPI:   Mak Garcia is a 24-year-old female with PMHx of ADHD and obesity. She presented to the ED from urgent care with nausea and vomiting onset yesterday around 2 PM. She reports it began soon after administering mounjaro, dose was increased from 2.5 to 5 this week. Has had multiple episodes of vomiting and associated non-radiating abdominal pain. Previously has had nausea/vomiting with the injections, but did not last more than a few hours. Denies fever, constipation, diarrhea, or any urinary symptoms.    In the ED lipase was she was 79. CT abdomen w/o contrast showed a small amount of free fluid in the pelvic cul de sac. Otherwise negative CT of the abdomen and pelvis. She has received multiple doses of zofran with little relief from nausea.    * No surgery found *      Hospital Course:   Mak Garcia was admitted for intractable nausea and vomiting after increased dose of mounjaro. Her vomiting was controlled with PRN anti-emetics. Tachycardia present on admission resolved with IV fluids. Abdominal workup was negative with negative CT abdomen and US abdomen limited. Negative repeat lipase. With some hypotension early AM 3/19, but pt was asymptomatic at this time with negative orthostatics. After chart review pt regularly Systolic <110. Tolerating diet well. Pt was educated on importance of small portions and bland diet and stopping her mounjaro. While in the hospital she ate a full plate of spaghetti resulting in nausea but no vomiting. Pt  received carafate and anti-emetic in the early afternoon. She was reassessed and has not had any vomiting or dizziness since admission and her nausea is well controlled with PRN zofran. She is able to tolerate liquids PO and was deemed ready for discharge.  She was D/C home with instructions to stop the Mounjaro, as well as course of Protonix and Carafate for suspected gastritis.  She was sent home with antiemetics as well.  Return precautions were provided.    Pt seen on day of discharge and deemed appropriate for discharge.     Goals of Care Treatment Preferences:  Code Status: Full Code         Consults:     Assessment & Plan  Intractable nausea and vomiting  Pt on mounjaro with dose increase 3/17. N/V since administration. Lipase mildly elevated - 79. CT with small amount of free fluid in the pelvic cul de sac. Otherwise negative CT of the abdomen and pelvis   -PRN anti-emetics  -pain control  -repeat lipase 22  -protonix  -sucralfate    Obesity (BMI 30-39.9)  Body mass index is 35.93 kg/m². Morbid obesity complicates all aspects of disease management from diagnostic modalities to treatment. Weight loss encouraged and health benefits explained to patient.       Hyponatremia  Hyponatremia is likely due to Dehydration/hypovolemia. The patient's most recent sodium results are listed below.  Recent Labs     03/18/25  0949 03/19/25  0452   * 135*     Plan  - Correct the sodium by 4-6mEq in 24 hours.   - TSH WNL  - Will treat the hyponatremia with IV fluids as follows:  cc/hr  - Monitor sodium Daily.   - Patient hyponatremia is stable  Dehydration  Patient presented with non-anion gap metabolic acidosis with intractable nausea and vomiting  -Continue IV fluids  -Daily BMP  -Orthostatics  Elevated lipase  Lipase 78; subsequent 22  CT of abdomen and pelvis with no inflammation or swelling or findings to suggest acute pancreatitis  IV fluids  Abdominal US  Serial abdominal exam  Advance diet as  tolerated  -tolerated liquid diet, continue to advance  Avoid all GLP 1 agonist  Tachycardia  Patient presented to the hospital heart rate 120s  Suspect sinus tachycardia secondary to dehydration due to nausea and vomiting  -Normal TSH  -Normal EKG  Place on telemetry monitoring  IVF for hydration   Pain control    Hypotension  Hypotension noted early morning hours 3/19. Pt is asymptomatic at this time.  -IVF  -Orthostatics  -PO hydration as tolerated      Final Active Diagnoses:    Diagnosis Date Noted POA    PRINCIPAL PROBLEM:  Intractable nausea and vomiting [R11.2] 03/18/2025 Yes    Hypotension [I95.9] 03/19/2025 No    Obesity (BMI 30-39.9) [E66.9] 03/18/2025 Yes    Hyponatremia [E87.1] 03/18/2025 Yes    Dehydration [E86.0] 03/18/2025 Yes    Elevated lipase [R74.8] 03/18/2025 Yes    Tachycardia [R00.0] 03/18/2025 Yes      Problems Resolved During this Admission:       Discharged Condition: good    Disposition: Home or Self Care    Follow Up:   Follow-up Information       Howard Yang FNP. Go on 3/28/2025.    Specialty: Family Medicine  Why: Hospital follow up scheduled at 11:00 AM.  Contact information:  [address] [place] [address]  [address] [place], [place] [id number] [phone]                           Patient Instructions:      Diet Adult Regular   Order Comments: [place] foods. Avoid fatty, spicy foods. Heavy meals. Small meals for up to 7 days.     Notify your health care provider if you experience any of the following:  severe uncontrolled pain     Notify your health care provider if you experience any of the following:  persistent nausea and vomiting or diarrhea     Notify your health care provider if you experience any of the following:  difficulty breathing or increased cough     Notify your health care provider if you experience any of the following:  persistent dizziness, light-headedness, or visual disturbances     Notify your health care provider if you experience any of the following:  increased confusion or  weakness     Activity as tolerated       Significant Diagnostic Studies: Labs: CMP   Recent Labs   Lab 03/18/25  0949 03/19/25  0452   * 135*   K 4.2 3.8    106   CO2 22* 22*   GLU 74 74   BUN 17 11   CREATININE 0.8 0.7   CALCIUM 9.6 8.2*   PROT 8.8* 6.5   ALBUMIN 4.3 3.2*   BILITOT 0.6 0.6   ALKPHOS 82 60   AST 20 15   ALT 10 7*   ANIONGAP 11 7*    and CBC   Recent Labs   Lab 03/18/25  0949 03/19/25  0452   WBC 9.49 5.60   HGB 13.4 10.9*   HCT 42.3 34.0*    288     Radiology: CT scan: CT ABDOMEN PELVIS WITHOUT CONTRAST:   Results for orders placed or performed during the hospital encounter of 03/18/25   CT Abdomen Pelvis  Without Contrast    Narrative    EXAMINATION:  CT ABDOMEN PELVIS WITHOUT CONTRAST    CLINICAL HISTORY:  Abdominal pain, acute, nonlocalized;    TECHNIQUE:  Low dose axial images, sagittal and coronal reformations were obtained from the lung bases to the pubic symphysis.    COMPARISON:  None    FINDINGS:  The liver is of normal size contour and CT density without focal mass.  The gallbladder is of normal size without CT evidence of stone.  The pancreas appears of normal contour and CT density without edema or mass.  The spleen is of normal size and CT density.    The adrenal glands are not enlarged.  The kidneys are of normal size contour and CT density.  Stone or hydronephrosis is not seen.  The abdominal aorta and inferior vena cava are of normal caliber.    The stomach is of normal configuration.  Small bowel dilatation or air-fluid levels are not seen.  The colon appears of normal configuration without distention or mass.  A normal appendix is noted.  Free fluid or free air is not identified.    The bladder is of normal contour without mass or asymmetry.  The uterus is not enlarged.  There is a small amount of free fluid in the cul de sac.      Impression    Small amount of free fluid is identified in the pelvic cul de sac.  Otherwise negative CT of the abdomen and  pelvis.      Electronically signed by: Enrique Manriquez MD  Date:    03/18/2025  Time:    11:40       Pending Diagnostic Studies:       Procedure Component Value Units Date/Time    EKG 12-lead [[id number]]     Order Status: Sent Lab Status: No result            Medications:  Reconciled Home Medications:      Medication List        START taking these medications      ondansetron 4 MG Tbdl  Commonly known as: ZOFRAN-ODT  Take 1 tablet (4 mg total) by mouth every 6 (six) hours as needed (nausea).     pantoprazole 40 MG tablet  Commonly known as: PROTONIX  Take 1 tablet (40 mg total) by mouth once daily.     sucralfate 1 gram tablet  Commonly known as: CARAFATE  Take 1 tablet (1 g total) by mouth 4 (four) times daily.              Indwelling Lines/Drains at time of discharge:   Lines/Drains/Airways       None                   Time spent on the discharge of patient: 35 minutes         Elaine Blackmon NP  Department of Hospital Medicine  [facility]/Surg

## 2025-03-27 ENCOUNTER — PATIENT MESSAGE (OUTPATIENT)
Dept: CASE MANAGEMENT | Facility: HOSPITAL | Age: 25
End: 2025-03-27

## 2025-03-29 LAB
OHS QRS DURATION: 80 MS
OHS QTC CALCULATION: 451 MS

## 2025-08-30 ENCOUNTER — HOSPITAL ENCOUNTER (EMERGENCY)
Facility: HOSPITAL | Age: 25
Discharge: HOME | End: 2025-08-30
Attending: STUDENT IN AN ORGANIZED HEALTH CARE EDUCATION/TRAINING PROGRAM
Payer: COMMERCIAL

## 2025-08-30 VITALS
OXYGEN SATURATION: 99 % | WEIGHT: 178 LBS | HEIGHT: 59 IN | RESPIRATION RATE: 18 BRPM | BODY MASS INDEX: 35.88 KG/M2 | SYSTOLIC BLOOD PRESSURE: 95 MMHG | DIASTOLIC BLOOD PRESSURE: 50 MMHG | HEART RATE: 66 BPM | TEMPERATURE: 99 F

## 2025-08-30 DIAGNOSIS — R07.9 CHEST PAIN: ICD-10-CM

## 2025-08-30 LAB
ABSOLUTE EOSINOPHIL (SMH): 0.09 K/UL
ABSOLUTE MONOCYTE (SMH): 0.74 K/UL (ref 0.3–1)
ABSOLUTE NEUTROPHIL COUNT (SMH): 3.9 K/UL (ref 1.8–7.7)
ALBUMIN SERPL-MCNC: 4.1 G/DL (ref 3.5–5.2)
ALP SERPL-CCNC: 66 UNIT/L (ref 55–135)
ALT SERPL-CCNC: 7 UNIT/L (ref 10–44)
ANION GAP (SMH): 7 MMOL/L (ref 8–16)
AST SERPL-CCNC: 18 UNIT/L (ref 10–40)
B-HCG UR QL: NEGATIVE
BASOPHILS # BLD AUTO: 0.06 K/UL
BASOPHILS NFR BLD AUTO: 0.8 %
BILIRUB SERPL-MCNC: 0.2 MG/DL (ref 0.1–1)
BILIRUB UR QL STRIP.AUTO: NEGATIVE
BNP SERPL-MCNC: 20 PG/ML
BUN SERPL-MCNC: 12 MG/DL (ref 6–20)
CALCIUM SERPL-MCNC: 9 MG/DL (ref 8.7–10.5)
CHLORIDE SERPL-SCNC: 106 MMOL/L (ref 95–110)
CLARITY UR: CLEAR
CO2 SERPL-SCNC: 23 MMOL/L (ref 23–29)
COLOR UR AUTO: YELLOW
CREAT SERPL-MCNC: 0.6 MG/DL (ref 0.5–1.4)
CTP QC/QA: YES
ERYTHROCYTE [DISTWIDTH] IN BLOOD BY AUTOMATED COUNT: 12.8 % (ref 11.5–14.5)
GFR SERPLBLD CREATININE-BSD FMLA CKD-EPI: >60 ML/MIN/1.73/M2
GLUCOSE SERPL-MCNC: 88 MG/DL (ref 70–110)
GLUCOSE UR QL STRIP: NEGATIVE
HCT VFR BLD AUTO: 39.8 % (ref 37–48.5)
HGB BLD-MCNC: 12.9 GM/DL (ref 12–16)
HGB UR QL STRIP: NEGATIVE
IMM GRANULOCYTES # BLD AUTO: 0.02 K/UL (ref 0–0.04)
IMM GRANULOCYTES NFR BLD AUTO: 0.3 % (ref 0–0.5)
KETONES UR QL STRIP: NEGATIVE
LEUKOCYTE ESTERASE UR QL STRIP: NEGATIVE
LYMPHOCYTES # BLD AUTO: 2.73 K/UL (ref 1–4.8)
MAGNESIUM SERPL-MCNC: 2 MG/DL (ref 1.6–2.6)
MCH RBC QN AUTO: 27.7 PG (ref 27–31)
MCHC RBC AUTO-ENTMCNC: 32.4 G/DL (ref 32–36)
MCV RBC AUTO: 85 FL (ref 82–98)
NITRITE UR QL STRIP: NEGATIVE
NUCLEATED RBC (/100WBC) (SMH): 0 /100 WBC
PH UR STRIP: 7 [PH]
PLATELET # BLD AUTO: 280 K/UL (ref 150–450)
PMV BLD AUTO: 10.5 FL (ref 9.2–12.9)
POTASSIUM SERPL-SCNC: 4.3 MMOL/L (ref 3.5–5.1)
PROT SERPL-MCNC: 7.4 GM/DL (ref 6–8.4)
PROT UR QL STRIP: NEGATIVE
RBC # BLD AUTO: 4.66 M/UL (ref 4–5.4)
RELATIVE EOSINOPHIL (SMH): 1.2 % (ref 0–8)
RELATIVE LYMPHOCYTE (SMH): 36.4 % (ref 18–48)
RELATIVE MONOCYTE (SMH): 9.9 % (ref 4–15)
RELATIVE NEUTROPHIL (SMH): 51.4 % (ref 38–73)
SODIUM SERPL-SCNC: 136 MMOL/L (ref 136–145)
SP GR UR STRIP: >=1.03
TROPONIN HIGH SENSITIVE (SMH): <2.3 PG/ML
TROPONIN HIGH SENSITIVE (SMH): <2.3 PG/ML
UROBILINOGEN UR STRIP-ACNC: NEGATIVE EU/DL
WBC # BLD AUTO: 7.51 K/UL (ref 3.9–12.7)

## 2025-08-30 PROCEDURE — 99285 EMERGENCY DEPT VISIT HI MDM: CPT | Mod: 25

## 2025-08-30 PROCEDURE — 93010 ELECTROCARDIOGRAM REPORT: CPT | Mod: ,,, | Performed by: GENERAL PRACTICE

## 2025-08-30 PROCEDURE — 83880 ASSAY OF NATRIURETIC PEPTIDE: CPT | Performed by: STUDENT IN AN ORGANIZED HEALTH CARE EDUCATION/TRAINING PROGRAM

## 2025-08-30 PROCEDURE — 96375 TX/PRO/DX INJ NEW DRUG ADDON: CPT

## 2025-08-30 PROCEDURE — 81025 URINE PREGNANCY TEST: CPT | Performed by: STUDENT IN AN ORGANIZED HEALTH CARE EDUCATION/TRAINING PROGRAM

## 2025-08-30 PROCEDURE — 25000003 PHARM REV CODE 250: Performed by: STUDENT IN AN ORGANIZED HEALTH CARE EDUCATION/TRAINING PROGRAM

## 2025-08-30 PROCEDURE — 85025 COMPLETE CBC W/AUTO DIFF WBC: CPT | Performed by: STUDENT IN AN ORGANIZED HEALTH CARE EDUCATION/TRAINING PROGRAM

## 2025-08-30 PROCEDURE — 81003 URINALYSIS AUTO W/O SCOPE: CPT | Performed by: STUDENT IN AN ORGANIZED HEALTH CARE EDUCATION/TRAINING PROGRAM

## 2025-08-30 PROCEDURE — 96374 THER/PROPH/DIAG INJ IV PUSH: CPT

## 2025-08-30 PROCEDURE — 93005 ELECTROCARDIOGRAM TRACING: CPT | Performed by: GENERAL PRACTICE

## 2025-08-30 PROCEDURE — 84484 ASSAY OF TROPONIN QUANT: CPT | Performed by: STUDENT IN AN ORGANIZED HEALTH CARE EDUCATION/TRAINING PROGRAM

## 2025-08-30 PROCEDURE — 80053 COMPREHEN METABOLIC PANEL: CPT | Performed by: STUDENT IN AN ORGANIZED HEALTH CARE EDUCATION/TRAINING PROGRAM

## 2025-08-30 PROCEDURE — 63600175 PHARM REV CODE 636 W HCPCS: Performed by: STUDENT IN AN ORGANIZED HEALTH CARE EDUCATION/TRAINING PROGRAM

## 2025-08-30 PROCEDURE — 83735 ASSAY OF MAGNESIUM: CPT | Performed by: STUDENT IN AN ORGANIZED HEALTH CARE EDUCATION/TRAINING PROGRAM

## 2025-08-30 RX ORDER — LIDOCAINE HYDROCHLORIDE 20 MG/ML
15 SOLUTION OROPHARYNGEAL ONCE
Status: COMPLETED | OUTPATIENT
Start: 2025-08-30 | End: 2025-08-30

## 2025-08-30 RX ORDER — FAMOTIDINE 20 MG/1
10 TABLET, FILM COATED ORAL 2 TIMES DAILY
Qty: 30 TABLET | Refills: 0 | Status: SHIPPED | OUTPATIENT
Start: 2025-08-30 | End: 2025-09-29

## 2025-08-30 RX ORDER — ALUMINUM HYDROXIDE, MAGNESIUM HYDROXIDE, AND SIMETHICONE 1200; 120; 1200 MG/30ML; MG/30ML; MG/30ML
30 SUSPENSION ORAL ONCE
Status: COMPLETED | OUTPATIENT
Start: 2025-08-30 | End: 2025-08-30

## 2025-08-30 RX ORDER — FAMOTIDINE 10 MG/ML
20 INJECTION, SOLUTION INTRAVENOUS
Status: COMPLETED | OUTPATIENT
Start: 2025-08-30 | End: 2025-08-30

## 2025-08-30 RX ORDER — ASPIRIN 325 MG
325 TABLET ORAL
Status: COMPLETED | OUTPATIENT
Start: 2025-08-30 | End: 2025-08-30

## 2025-08-30 RX ORDER — KETOROLAC TROMETHAMINE 30 MG/ML
15 INJECTION, SOLUTION INTRAMUSCULAR; INTRAVENOUS
Status: COMPLETED | OUTPATIENT
Start: 2025-08-30 | End: 2025-08-30

## 2025-08-30 RX ADMIN — LIDOCAINE HYDROCHLORIDE 15 ML: 20 SOLUTION ORAL at 04:08

## 2025-08-30 RX ADMIN — KETOROLAC TROMETHAMINE 15 MG: 30 INJECTION, SOLUTION INTRAMUSCULAR; INTRAVENOUS at 04:08

## 2025-08-30 RX ADMIN — ALUMINUM HYDROXIDE, MAGNESIUM HYDROXIDE, AND SIMETHICONE 30 ML: 200; 200; 20 SUSPENSION ORAL at 04:08

## 2025-08-30 RX ADMIN — FAMOTIDINE 20 MG: 10 INJECTION, SOLUTION INTRAVENOUS at 04:08

## 2025-08-30 RX ADMIN — ASPIRIN 325 MG ORAL TABLET 325 MG: 325 PILL ORAL at 02:08

## 2025-09-05 LAB
OHS QRS DURATION: 78 MS
OHS QTC CALCULATION: 440 MS